# Patient Record
Sex: FEMALE | Race: WHITE | NOT HISPANIC OR LATINO | Employment: FULL TIME | URBAN - METROPOLITAN AREA
[De-identification: names, ages, dates, MRNs, and addresses within clinical notes are randomized per-mention and may not be internally consistent; named-entity substitution may affect disease eponyms.]

---

## 2017-02-10 DIAGNOSIS — Z12.31 ENCOUNTER FOR SCREENING MAMMOGRAM FOR MALIGNANT NEOPLASM OF BREAST: ICD-10-CM

## 2017-02-13 ENCOUNTER — ALLSCRIPTS OFFICE VISIT (OUTPATIENT)
Dept: OTHER | Facility: OTHER | Age: 62
End: 2017-02-13

## 2017-04-12 ENCOUNTER — ALLSCRIPTS OFFICE VISIT (OUTPATIENT)
Dept: OTHER | Facility: OTHER | Age: 62
End: 2017-04-12

## 2017-04-25 ENCOUNTER — ALLSCRIPTS OFFICE VISIT (OUTPATIENT)
Dept: OTHER | Facility: OTHER | Age: 62
End: 2017-04-25

## 2017-04-25 DIAGNOSIS — Z12.39 ENCOUNTER FOR OTHER SCREENING FOR MALIGNANT NEOPLASM OF BREAST: ICD-10-CM

## 2017-07-06 ENCOUNTER — TRANSCRIBE ORDERS (OUTPATIENT)
Dept: LAB | Facility: HOSPITAL | Age: 62
End: 2017-07-06

## 2017-07-06 DIAGNOSIS — Z00.8 HEALTH EXAMINATION IN POPULATION SURVEY: Primary | ICD-10-CM

## 2017-07-07 ENCOUNTER — APPOINTMENT (OUTPATIENT)
Dept: LAB | Facility: HOSPITAL | Age: 62
End: 2017-07-07
Payer: COMMERCIAL

## 2017-07-07 DIAGNOSIS — Z00.8 HEALTH EXAMINATION IN POPULATION SURVEY: ICD-10-CM

## 2017-07-07 LAB
CHOLEST SERPL-MCNC: 211 MG/DL (ref 50–200)
EST. AVERAGE GLUCOSE BLD GHB EST-MCNC: 120 MG/DL
HBA1C MFR BLD: 5.8 % (ref 4.2–6.3)
HDLC SERPL-MCNC: 71 MG/DL (ref 40–60)
LDLC SERPL CALC-MCNC: 124 MG/DL (ref 0–100)
TRIGL SERPL-MCNC: 79 MG/DL

## 2017-07-07 PROCEDURE — 80061 LIPID PANEL: CPT

## 2017-07-07 PROCEDURE — 83036 HEMOGLOBIN GLYCOSYLATED A1C: CPT

## 2017-07-07 PROCEDURE — 36415 COLL VENOUS BLD VENIPUNCTURE: CPT

## 2017-08-01 ENCOUNTER — ALLSCRIPTS OFFICE VISIT (OUTPATIENT)
Dept: OTHER | Facility: OTHER | Age: 62
End: 2017-08-01

## 2017-08-11 ENCOUNTER — ALLSCRIPTS OFFICE VISIT (OUTPATIENT)
Dept: OTHER | Facility: OTHER | Age: 62
End: 2017-08-11

## 2017-08-11 ENCOUNTER — TRANSCRIBE ORDERS (OUTPATIENT)
Dept: ADMINISTRATIVE | Facility: HOSPITAL | Age: 62
End: 2017-08-11

## 2017-08-11 ENCOUNTER — LAB REQUISITION (OUTPATIENT)
Dept: LAB | Facility: HOSPITAL | Age: 62
End: 2017-08-11
Payer: COMMERCIAL

## 2017-08-11 ENCOUNTER — HOSPITAL ENCOUNTER (OUTPATIENT)
Dept: RADIOLOGY | Age: 62
Discharge: HOME | End: 2017-08-11
Payer: COMMERCIAL

## 2017-08-11 DIAGNOSIS — R10.9 ABDOMINAL PAIN, UNSPECIFIED SITE: Primary | ICD-10-CM

## 2017-08-11 DIAGNOSIS — R10.9 ABDOMINAL PAIN: ICD-10-CM

## 2017-08-11 DIAGNOSIS — R10.9 ABDOMINAL PAIN, UNSPECIFIED SITE: ICD-10-CM

## 2017-08-11 LAB
BILIRUB UR QL STRIP: NEGATIVE
BILIRUB UR QL STRIP: NEGATIVE
CLARITY UR: CLEAR
CLARITY UR: NORMAL
COLOR UR: YELLOW
COLOR UR: YELLOW
GLUCOSE (HISTORICAL): NEGATIVE
GLUCOSE UR STRIP-MCNC: NEGATIVE MG/DL
HGB UR QL STRIP.AUTO: NEGATIVE
HGB UR QL STRIP.AUTO: NEGATIVE
KETONES UR STRIP-MCNC: NEGATIVE MG/DL
KETONES UR STRIP-MCNC: NEGATIVE MG/DL
LEUKOCYTE ESTERASE UR QL STRIP: NEGATIVE
LEUKOCYTE ESTERASE UR QL STRIP: NEGATIVE
NITRITE UR QL STRIP: NEGATIVE
NITRITE UR QL STRIP: NEGATIVE
PH UR STRIP.AUTO: 5 [PH]
PH UR STRIP.AUTO: 5 [PH] (ref 4.5–8)
PROT UR STRIP-MCNC: NEGATIVE MG/DL
PROT UR STRIP-MCNC: NEGATIVE MG/DL
SP GR UR STRIP.AUTO: 1.01
SP GR UR STRIP.AUTO: 1.01 (ref 1–1.03)
UROBILINOGEN UR QL STRIP.AUTO: 0.2
UROBILINOGEN UR QL STRIP.AUTO: 0.2 E.U./DL

## 2017-08-11 PROCEDURE — 74176 CT ABD & PELVIS W/O CONTRAST: CPT

## 2017-08-11 PROCEDURE — 81003 URINALYSIS AUTO W/O SCOPE: CPT | Performed by: FAMILY MEDICINE

## 2017-08-14 ENCOUNTER — GENERIC CONVERSION - ENCOUNTER (OUTPATIENT)
Dept: OTHER | Facility: OTHER | Age: 62
End: 2017-08-14

## 2017-09-11 ENCOUNTER — HOSPITAL ENCOUNTER (OUTPATIENT)
Dept: RADIOLOGY | Age: 62
Discharge: HOME | End: 2017-09-11
Payer: COMMERCIAL

## 2017-09-11 DIAGNOSIS — Z12.39 ENCOUNTER FOR OTHER SCREENING FOR MALIGNANT NEOPLASM OF BREAST: ICD-10-CM

## 2017-09-11 PROCEDURE — G0202 SCR MAMMO BI INCL CAD: HCPCS

## 2017-09-28 ENCOUNTER — ANESTHESIA EVENT (OUTPATIENT)
Dept: GASTROENTEROLOGY | Facility: HOSPITAL | Age: 62
End: 2017-09-28
Payer: COMMERCIAL

## 2017-09-28 RX ORDER — FAMOTIDINE 20 MG/1
20 TABLET, FILM COATED ORAL DAILY
COMMUNITY

## 2017-09-29 ENCOUNTER — HOSPITAL ENCOUNTER (OUTPATIENT)
Facility: HOSPITAL | Age: 62
Setting detail: OUTPATIENT SURGERY
Discharge: HOME | End: 2017-09-29
Attending: INTERNAL MEDICINE | Admitting: INTERNAL MEDICINE
Payer: COMMERCIAL

## 2017-09-29 ENCOUNTER — GENERIC CONVERSION - ENCOUNTER (OUTPATIENT)
Dept: OTHER | Facility: OTHER | Age: 62
End: 2017-09-29

## 2017-09-29 ENCOUNTER — ANESTHESIA (OUTPATIENT)
Dept: GASTROENTEROLOGY | Facility: HOSPITAL | Age: 62
End: 2017-09-29
Payer: COMMERCIAL

## 2017-09-29 VITALS
BODY MASS INDEX: 34.55 KG/M2 | OXYGEN SATURATION: 100 % | TEMPERATURE: 97.8 F | HEART RATE: 74 BPM | RESPIRATION RATE: 18 BRPM | HEIGHT: 63 IN | WEIGHT: 195 LBS | DIASTOLIC BLOOD PRESSURE: 73 MMHG | SYSTOLIC BLOOD PRESSURE: 135 MMHG

## 2017-09-29 DIAGNOSIS — Z12.11 ENCOUNTER FOR SCREENING FOR MALIGNANT NEOPLASM OF COLON: ICD-10-CM

## 2017-09-29 DIAGNOSIS — K21.9 GASTRO-ESOPHAGEAL REFLUX DISEASE WITHOUT ESOPHAGITIS: ICD-10-CM

## 2017-09-29 PROCEDURE — 88305 TISSUE EXAM BY PATHOLOGIST: CPT | Performed by: INTERNAL MEDICINE

## 2017-09-29 PROCEDURE — 88342 IMHCHEM/IMCYTCHM 1ST ANTB: CPT | Performed by: INTERNAL MEDICINE

## 2017-09-29 RX ORDER — SODIUM CHLORIDE 9 MG/ML
125 INJECTION, SOLUTION INTRAVENOUS CONTINUOUS
Status: DISCONTINUED | OUTPATIENT
Start: 2017-09-29 | End: 2017-09-29 | Stop reason: HOSPADM

## 2017-09-29 RX ORDER — PROPOFOL 10 MG/ML
INJECTION, EMULSION INTRAVENOUS AS NEEDED
Status: DISCONTINUED | OUTPATIENT
Start: 2017-09-29 | End: 2017-09-29 | Stop reason: SURG

## 2017-09-29 RX ADMIN — SODIUM CHLORIDE 125 ML/HR: 0.9 INJECTION, SOLUTION INTRAVENOUS at 10:17

## 2017-09-29 RX ADMIN — PROPOFOL 100 MG: 10 INJECTION, EMULSION INTRAVENOUS at 10:51

## 2017-09-29 RX ADMIN — PROPOFOL 100 MG: 10 INJECTION, EMULSION INTRAVENOUS at 11:11

## 2017-09-29 RX ADMIN — PROPOFOL 100 MG: 10 INJECTION, EMULSION INTRAVENOUS at 10:48

## 2017-09-29 NOTE — OP NOTE
OPERATIVE REPORT  PATIENT NAME: Franky Mora    :  1955  MRN: [id number]  Pt Location: [facility] ROOM 01    SURGERY DATE: 2017    Surgeon(s) and Role:     * Brando Baerd DO - Primary  Normachristy Cordoba, OMS-III also present for procedure  Preop Diagnosis:  Encounter for screening for malignant neoplasm of colon [Z12 11]  Gastro-esophageal reflux disease without esophagitis [K21 9]    Post-Op Diagnosis Codes:     * Encounter for screening for malignant neoplasm of colon [Z12 11]     * Gastro-esophageal reflux disease without esophagitis [K21 9]     * Gastritis [K29 70]    Procedure(s) (LRB):  EGD with bx  AND COLONOSCOPY (N/A)    Specimen(s):  ID Type Source Tests Collected by Time Destination   1 : body of stomach bx, gastritis Tissue Stomach TISSUE EXAM Brando Beard DO 2017 1057      ESOPHAGOGASTRODUODENOSCOPY    PROCEDURE: EGD    SEDATION: Monitored anesthesia care, check anesthesia records    ASA Class: 2    INDICATIONS: Chronic GERD    CONSENT:  Informed consent was obtained for the procedure, including sedation after explaining the risks and benefits of the procedure  Risks including but not limited to bleeding, perforation, infection, and missed lesion  PREPARATION:   Telemetry, pulse oximetry, blood pressure were monitored throughout the procedure  Patient was identified by myself both verbally and by visual inspection of ID band  DESCRIPTION:   Patient was placed in the left lateral decubitus position and was sedated with the above medication  The gastroscope was introduced in to the oropharynx and the esophagus was intubated under direct visualization  Scope was passed down the esophagus up to 2nd part of the duodenum  A careful inspection was made as the gastroscope was withdrawn, including a retroflexed view of the stomach; findings and interventions are described below       FINDINGS:    #1  Esophagus- Normal appearing esophagus with irregular Z line at [employer] junction  #2  Stomach- Mild gastritis present  Cold forceps biopsy performed on stomach body  #3  Duodenum- Normal first and second part of duodenum  IMPRESSIONS:    Normal appearing esophagus  Mild gastritis present; subsequent cold forceps biopsy obtained  Normal first and second part of duodenum  RECOMMENDATIONS:   Await biopsy results  Patient to follow-up as needed per symptoms  COMPLICATIONS:  None; patient tolerated the procedure well  DISPOSITION: PACU           CONDITION: Stable    Colonoscopy Procedure Note    Procedure: Colonoscopy    Sedation: Monitored anesthesia care, check anesthesia records      ASA Class: 2    INDICATIONS: Screening for Colon Cancer    POST-OP DIAGNOSIS: See the impression below    Procedure Details     Informed consent was obtained for the procedure, including sedation  Risks of perforation, hemorrhage, adverse drug reaction and aspiration were discussed  The patient was placed in the left lateral decubitus position  Based on the pre-procedure assessment, including review of the patient's medical history, medications, allergies, and review of systems, she had been deemed to be an appropriate candidate for conscious sedation; she was therefore sedated with the medications listed below  The patient was monitored continuously with telemetry, pulse oximetry, blood pressure monitoring, and direct observations  A rectal examination was performed  The colonoscope was inserted into the rectum and advanced under direct vision to the cecum, which was identified by the ileocecal valve and appendiceal orifice  The quality of the colonic preparation was good  A careful inspection was made as the colonoscope was withdrawn, including a retroflexed view of the rectum; findings and interventions are described below  Findings:  Normal appearing colon on direct view, except for the presence of a few scattered diverticuli in descending colon   Normal appearing rectum in direct and retroflexed view  Complications:  None; patient tolerated the procedure well  Impression:    Few diverticuli present in descending colon  Normal appearing rectum  Recommendations:  Recommend patient receive ten year follow-up screening colonoscopy        Tereza Garcia DO  DATE: September 29, 2017  TIME: 10:59 AM

## 2017-09-29 NOTE — DISCHARGE INSTRUCTIONS
Gastritis   WHAT YOU NEED TO KNOW:   Gastritis is inflammation or irritation of the lining of your stomach  DISCHARGE INSTRUCTIONS:   Call 911 for any of the following:   · You develop chest pain or shortness of breath  Seek care immediately if:   · You vomit blood  · You have black or bloody bowel movements  · You have severe stomach or back pain  Contact your healthcare provider if:   · You have a fever  · You have new or worsening symptoms, even after treatment  · You have questions or concerns about your condition or care  Medicines:   · Medicines  may be given to help treat a bacterial infection or decrease stomach acid  · Take your medicine as directed  Contact your healthcare provider if you think your medicine is not helping or if you have side effects  Tell him or her if you are allergic to any medicine  Keep a list of the medicines, vitamins, and herbs you take  Include the amounts, and when and why you take them  Bring the list or the pill bottles to follow-up visits  Carry your medicine list with you in case of an emergency  Manage or prevent gastritis:   · Do not smoke  Nicotine and other chemicals in cigarettes and cigars can make your symptoms worse and cause lung damage  Ask your healthcare provider for information if you currently smoke and need help to quit  E-cigarettes or smokeless tobacco still contain nicotine  Talk to your healthcare provider before you use these products  · Do not drink alcohol  Alcohol can prevent healing and make your gastritis worse  Talk to your healthcare provider if you need help to stop drinking  · Do not take NSAIDs or aspirin unless directed  These and similar medicines can cause irritation  If your healthcare provider says it is okay to take NSAIDs, take them with food  · Do not eat foods that cause irritation  Foods such as oranges and salsa can cause burning or pain  Eat a variety of healthy foods   Examples include fruits (not citrus), vegetables, low-fat dairy products, beans, whole-grain breads, and lean meats and fish  Try to eat small meals, and drink water with your meals  Do not eat for at least 3 hours before you go to bed  · Find ways to relax and decrease stress  Stress can increase stomach acid and make gastritis worse  Activities such as yoga, meditation, or listening to music can help you relax  Spend time with friends, or do things you enjoy  Follow up with your healthcare provider as directed: You may need ongoing tests or treatment, or referral to a gastroenterologist  Write down your questions so you remember to ask them during your visits  © 2017 [employer]  Information is for End User's use only and may not be sold, redistributed or otherwise used for commercial purposes  All illustrations and images included in CareNotes® are the copyrighted property of A D [employer] M [employer]  or [employer]  The above information is an  only  It is not intended as medical advice for individual conditions or treatments  Talk to your doctor, nurse or pharmacist before following any medical regimen to see if it is safe and effective for you  Diverticulosis   WHAT YOU NEED TO KNOW:   Diverticulosis is a condition that causes small pockets called diverticula to form in your intestine  These pockets make it difficult for bowel movements to pass through your digestive system  DISCHARGE INSTRUCTIONS:   Seek care immediately if:   · You have severe pain on the left side of your lower abdomen  · Your bowel movements are bright or dark red  Contact your healthcare provider if:   · You have a fever and chills  · You feel dizzy or lightheaded  · You have nausea, or you are vomiting  · You have a change in your bowel movements  · You have questions or concerns about your condition or care  Medicines:   · Medicines  to soften your bowel movements may be given  You may also need medicines to treat symptoms such as bloating and pain  · Take your medicine as directed  Contact your healthcare provider if you think your medicine is not helping or if you have side effects  Tell him or her if you are allergic to any medicine  Keep a list of the medicines, vitamins, and herbs you take  Include the amounts, and when and why you take them  Bring the list or the pill bottles to follow-up visits  Carry your medicine list with you in case of an emergency  Self-care: The goal of treatment is to manage any symptoms you have and prevent other problems such as diverticulitis  Diverticulitis is swelling or infection of the diverticula  Your healthcare provider may recommend any of the following:  · Eat a variety of high-fiber foods  High-fiber foods help you have regular bowel movements  High-fiber foods include cooked beans, fruits, vegetables, and some cereals  Most adults need 25 to 35 grams of fiber each day  Your healthcare provider may recommend that you have more  Ask your healthcare provider how much fiber you need  Increase fiber slowly  You may have abdominal discomfort, bloating, and gas if you add fiber to your diet too quickly  You may need to take a fiber supplement if you are not getting enough fiber from food  · Drink liquids as directed  You may need to drink 2 to 3 liters (8 to 12 cups) of liquids every day  Ask your healthcare provider how much liquid to drink each day and which liquids are best for you  · Apply heat  on your abdomen for 20 to 30 minutes every 2 hours for as many days as directed  Heat helps decrease pain and muscle spasms  Help prevent diverticulitis or other symptoms: The following may help decrease your risk for diverticulitis or symptoms, such as bleeding  Talk to your provider about these or other things you can do to prevent problems that may occur with diverticulosis  · Exercise regularly    Ask your healthcare provider about the best exercise plan for you  Exercise can help you have regular bowel movements  Get 30 minutes of exercise on most days of the week  · Maintain a healthy weight  Ask your healthcare provider how much you should weigh  Ask him or her to help you create a weight loss plan if you are overweight  · Do not smoke  Nicotine and other chemicals in cigarettes increase your risk for diverticulitis  Ask your healthcare provider for information if you currently smoke and need help to quit  E-cigarettes or smokeless tobacco still contain nicotine  Talk to your healthcare provider before you use these products  · Ask your healthcare provider if it is safe to take NSAIDs  NSAIDs may increase your risk of diverticulitis  Follow up with your healthcare provider as directed:  Write down your questions so you remember to ask them during your visits  © 2017 [employer] [facility] Information is for End User's use only and may not be sold, redistributed or otherwise used for commercial purposes  All illustrations and images included in CareNotes® are the copyrighted property of A D A M , Inc  or [employer]  The above information is an  only  It is not intended as medical advice for individual conditions or treatments  Talk to your doctor, nurse or pharmacist before following any medical regimen to see if it is safe and effective for you

## 2017-10-06 ENCOUNTER — GENERIC CONVERSION - ENCOUNTER (OUTPATIENT)
Dept: OTHER | Facility: OTHER | Age: 62
End: 2017-10-06

## 2018-01-09 NOTE — MISCELLANEOUS
Provider Comments  Provider Comments:   PT NO SHOW FOR APPT      Signatures   Electronically signed by :  Mariana Taylor, ; Feb 13 2017 10:51AM EST                       (Author)

## 2018-01-10 NOTE — RESULT NOTES
Discussion/Summary   called pt and gave results of h pylori  will treat     Verified Results  (1) TISSUE EXAM [id number] 10:57AM 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  - Chronic oxyntic gastritis  Note: H  pylori organisms identified on H&E and immunohistochemical stains   in the submitted antral biopsy  The submitted oxyntic biopsies show no   evidence of Helicobacter pylori organisms on H&E or immunohistochemical   stain  Appropriate positive and negative controls obtained  Interpretation performed at [place] [address]  Electronically signed by Petrona Mackey DO on 10/3/2017 at 10:32 AM   LAB AP SURGICAL ADDITIONAL INFORMATION (Report)     All controls performed with the immunohistochemical stains reported above   reacted appropriately  These tests were developed and their performance   characteristics determined by [facility] or   [address] [place] [address]  They may not be cleared or approved by the [place]  Food and Drug Administration  The FDA has determined that such clearance   or approval is not necessary  These tests are used for clinical purposes  They should not be regarded as investigational or for research  This   laboratory has been approved by [place] [address], designated as a high-complexity   laboratory and is qualified to perform these tests     LAB AP GROSS DESCRIPTION (Report)     A  The specimen is received in formalin, labeled with the patient's name   and hospital number, and is designated body of stomach biopsy  The   specimen consists of 5 tan-pink soft tissue fragments ranging from 0 2 cm   to 0 5 cm in greatest dimension  Entirely submitted  One cassette  Note: The estimated total formalin fixation time based upon information   provided by the submitting clinician and the standard processing schedule   is approximately 72 hours     MAS

## 2018-01-12 VITALS
DIASTOLIC BLOOD PRESSURE: 70 MMHG | HEART RATE: 76 BPM | HEIGHT: 64 IN | TEMPERATURE: 98.4 F | WEIGHT: 200 LBS | SYSTOLIC BLOOD PRESSURE: 118 MMHG | BODY MASS INDEX: 34.15 KG/M2 | RESPIRATION RATE: 18 BRPM

## 2018-01-13 VITALS
SYSTOLIC BLOOD PRESSURE: 126 MMHG | DIASTOLIC BLOOD PRESSURE: 80 MMHG | WEIGHT: 204.5 LBS | TEMPERATURE: 98 F | HEART RATE: 64 BPM | RESPIRATION RATE: 16 BRPM | BODY MASS INDEX: 34.91 KG/M2 | HEIGHT: 64 IN

## 2018-01-13 VITALS
HEART RATE: 84 BPM | TEMPERATURE: 98.4 F | WEIGHT: 202.25 LBS | DIASTOLIC BLOOD PRESSURE: 90 MMHG | RESPIRATION RATE: 14 BRPM | HEIGHT: 64 IN | SYSTOLIC BLOOD PRESSURE: 142 MMHG | BODY MASS INDEX: 34.53 KG/M2

## 2018-01-13 NOTE — RESULT NOTES
Verified Results  XR HIP/PELV 4+ VIEW RIGHT [id number] 03:26PM Elena Biancih Order Number: [id number]     Test Name Result Flag Reference   XR HIP/PELV 4+ VW RIGHT (Report)     RIGHT HIP     INDICATION: Right hip pain  COMPARISON: Left hip May 1, 2014     VIEWS: AP (x2) pelvis and 2 coned down views of the hip; 4 images     FINDINGS:     There is no acute fracture or dislocation  No degenerative changes  No lytic or blastic lesions are seen  Soft tissues are unremarkable  IMPRESSION:     No acute osseous abnormality  Workstation performed: [id number]     Signed by:   Jacklyn Mejia MD   5/3/16

## 2018-01-14 VITALS
DIASTOLIC BLOOD PRESSURE: 88 MMHG | HEIGHT: 64 IN | SYSTOLIC BLOOD PRESSURE: 151 MMHG | WEIGHT: 200.38 LBS | OXYGEN SATURATION: 98 % | BODY MASS INDEX: 34.21 KG/M2 | HEART RATE: 94 BPM

## 2018-02-09 ENCOUNTER — HOSPITAL ENCOUNTER (EMERGENCY)
Facility: HOSPITAL | Age: 63
Discharge: HOME | End: 2018-02-09
Attending: EMERGENCY MEDICINE | Admitting: EMERGENCY MEDICINE
Payer: COMMERCIAL

## 2018-02-09 ENCOUNTER — APPOINTMENT (EMERGENCY)
Dept: RADIOLOGY | Facility: HOSPITAL | Age: 63
End: 2018-02-09
Payer: COMMERCIAL

## 2018-02-09 VITALS
HEART RATE: 82 BPM | DIASTOLIC BLOOD PRESSURE: 88 MMHG | TEMPERATURE: 97.7 F | HEIGHT: 64 IN | WEIGHT: 200 LBS | SYSTOLIC BLOOD PRESSURE: 151 MMHG | OXYGEN SATURATION: 95 % | RESPIRATION RATE: 23 BRPM | BODY MASS INDEX: 34.15 KG/M2

## 2018-02-09 DIAGNOSIS — S52.532A COLLES' FRACTURE OF LEFT RADIUS: Primary | ICD-10-CM

## 2018-02-09 PROCEDURE — 99284 EMERGENCY DEPT VISIT MOD MDM: CPT

## 2018-02-09 PROCEDURE — 73100 X-RAY EXAM OF WRIST: CPT

## 2018-02-09 PROCEDURE — 73070 X-RAY EXAM OF ELBOW: CPT

## 2018-02-09 PROCEDURE — 96374 THER/PROPH/DIAG INJ IV PUSH: CPT

## 2018-02-09 PROCEDURE — 73110 X-RAY EXAM OF WRIST: CPT

## 2018-02-09 PROCEDURE — 96376 TX/PRO/DX INJ SAME DRUG ADON: CPT

## 2018-02-09 PROCEDURE — 96375 TX/PRO/DX INJ NEW DRUG ADDON: CPT

## 2018-02-09 RX ORDER — ACETAMINOPHEN 325 MG/1
650 TABLET ORAL ONCE
Status: COMPLETED | OUTPATIENT
Start: 2018-02-09 | End: 2018-02-09

## 2018-02-09 RX ORDER — FENTANYL CITRATE 50 UG/ML
50 INJECTION, SOLUTION INTRAMUSCULAR; INTRAVENOUS ONCE
Status: COMPLETED | OUTPATIENT
Start: 2018-02-09 | End: 2018-02-09

## 2018-02-09 RX ORDER — PROPOFOL 10 MG/ML
110 INJECTION, EMULSION INTRAVENOUS ONCE
Status: COMPLETED | OUTPATIENT
Start: 2018-02-09 | End: 2018-02-09

## 2018-02-09 RX ORDER — OXYCODONE HYDROCHLORIDE AND ACETAMINOPHEN 5; 325 MG/1; MG/1
1 TABLET ORAL EVERY 6 HOURS PRN
Qty: 12 TABLET | Refills: 0 | Status: SHIPPED | OUTPATIENT
Start: 2018-02-09 | End: 2018-02-12

## 2018-02-09 RX ORDER — PROPOFOL 10 MG/ML
1 INJECTION, EMULSION INTRAVENOUS ONCE
Status: COMPLETED | OUTPATIENT
Start: 2018-02-09 | End: 2018-02-09

## 2018-02-09 RX ORDER — PROPOFOL 10 MG/ML
INJECTION, EMULSION INTRAVENOUS
Status: DISCONTINUED
Start: 2018-02-09 | End: 2018-02-10 | Stop reason: HOSPADM

## 2018-02-09 RX ORDER — LIDOCAINE HYDROCHLORIDE AND EPINEPHRINE 10; 10 MG/ML; UG/ML
20 INJECTION, SOLUTION INFILTRATION; PERINEURAL ONCE
Status: COMPLETED | OUTPATIENT
Start: 2018-02-09 | End: 2018-02-09

## 2018-02-09 RX ORDER — OMEPRAZOLE 40 MG/1
1 CAPSULE, DELAYED RELEASE ORAL 2 TIMES DAILY
COMMUNITY
Start: 2017-10-06 | End: 2018-11-27 | Stop reason: ALTCHOICE

## 2018-02-09 RX ORDER — ONDANSETRON 2 MG/ML
4 INJECTION INTRAMUSCULAR; INTRAVENOUS ONCE
Status: COMPLETED | OUTPATIENT
Start: 2018-02-09 | End: 2018-02-09

## 2018-02-09 RX ADMIN — PROPOFOL 110 MG: 10 INJECTION, EMULSION INTRAVENOUS at 23:00

## 2018-02-09 RX ADMIN — LIDOCAINE HYDROCHLORIDE 100 MG: 20 INJECTION INTRAVENOUS at 19:11

## 2018-02-09 RX ADMIN — FENTANYL CITRATE 50 MCG: 50 INJECTION INTRAMUSCULAR; INTRAVENOUS at 17:55

## 2018-02-09 RX ADMIN — PROPOFOL 50 MG: 10 INJECTION, EMULSION INTRAVENOUS at 21:53

## 2018-02-09 RX ADMIN — LIDOCAINE HYDROCHLORIDE 200 MG: 20 INJECTION INTRAVENOUS at 19:10

## 2018-02-09 RX ADMIN — LIDOCAINE HYDROCHLORIDE,EPINEPHRINE BITARTRATE 20 ML: 10; .01 INJECTION, SOLUTION INFILTRATION; PERINEURAL at 18:04

## 2018-02-09 RX ADMIN — ONDANSETRON 4 MG: 2 INJECTION INTRAMUSCULAR; INTRAVENOUS at 17:55

## 2018-02-09 RX ADMIN — PROPOFOL 91 MG: 10 INJECTION, EMULSION INTRAVENOUS at 21:50

## 2018-02-09 RX ADMIN — ACETAMINOPHEN 650 MG: 325 TABLET, FILM COATED ORAL at 17:46

## 2018-02-09 RX ADMIN — FENTANYL CITRATE 50 MCG: 50 INJECTION INTRAMUSCULAR; INTRAVENOUS at 19:04

## 2018-02-09 RX ADMIN — FENTANYL CITRATE 50 MCG: 50 INJECTION INTRAMUSCULAR; INTRAVENOUS at 21:47

## 2018-02-09 NOTE — ED ATTENDING ATTESTATION
Jerald Vasques MD, saw and evaluated the patient  All available labs and X-rays were ordered by me or the resident and have been reviewed by myself  I discussed the patient with the resident / non-physician and agree with the resident's / non-physician practitioner's findings and plan as documented in the resident's / non-physician practicitioner's note, except where noted  At this point, I agree with the current assessment done in the ED  Chief Complaint   Patient presents with    Wrist Injury     slipped on ice while feeding the birds, sustained injury to left wrist with obvious deformity,  + pulse palpable     The patient was bird feeding and slipped on ice, broke her fall with left wrist   Obvious deformity  Occurred PTA  Denies f/ch/n/v/cp/sob  No head trauma  Remembers events  Denies numbness / tingling  PMH:  - GERD  - DVt  - Hx of PE  PSH:  - colonoscopy  Denies smoking, drinking, drugs  PE:  Vitals:    02/09/18 2230 02/09/18 2235 02/09/18 2240 02/09/18 2245   BP: 167/96 168/96 168/96 151/88   BP Location: Right arm Right arm Right arm Right arm   Pulse: 84 82 82 82   Resp: 18 18 18 (!) 23   Temp:       TempSrc:       SpO2: 95% 95% 96% 95%   Weight:       Height:       General: VS reviewed  Appears in NAD  awake, alert  Well-nourished, well-developed  Appears stated age  Speaking normally in full sentences  Head: Normocephalic, atraumatic, nontender  Eyes: EOM-I  No diplopia  No hyphema  No subconjunctival hemorrhages  Symmetrical lids  ENT: Atraumatic external nose and ears  MMM  No malocclusion  No stridor  Normal phonation  No drooling  Normal swallowing  Neck: No JVD  CV: No pallor noted  Peripheral pulses +2 throughout  No chest wall tenderness     Lungs:   No tachypnea  No respiratory distress  MSK:   1+2 possible, but severely painful  Refuses 1+5 but can weakly attempt  2+ radial pulse pable  <2 second cap refill   normal  Refuses wrist movements  Obvious deformity  BICEPS TRIPCES 5/5  ABD/ADD of shoudler 5/5  M/U/R/A sensation intact, b/l equal  No skin deformity  Skin: Dry, intact  Neuro: Awake, alert, GCS15, CN II-XII grossly intact  Motor grossly intact  Psychiatric/Behavioral: Appropriate mood and affect   Exam: deferred  A:  - Wrist fx  P:  - block  - XR confirm  - reduce  - splint  - 13 point ROS was performed and all are normal unless stated in the history above  - Nursing note reviewed  Vitals reviewed  - Orders placed by myself and/or advanced practitioner / resident     - Previous chart was not reviewed  - No language barrier    - History obtained from patient  - There are no limitations to the history obtained  - Critical care time: Not applicable for this patient  Final Diagnosis:  1  Colles' fracture of left radius        ED Course as of Feb 12 1350 Fri Feb 09, 2018 1925 Has significant improvement in s patient had significant improvementymptom in symptoms  She is placed in finger traps  2216 I was present for propofol sedation    Successful  Transient hypoxia to high 80s      Medications   acetaminophen (TYLENOL) tablet 650 mg (650 mg Oral Given 2/9/18 1746)   fentanyl citrate (PF) 100 MCG/2ML 50 mcg (50 mcg Intravenous Given 2/9/18 1755)   ondansetron (ZOFRAN) injection 4 mg (4 mg Intravenous Given 2/9/18 1755)   lidocaine-epinephrine (XYLOCAINE/EPINEPHRINE) 1 %-1:100,000 injection 20 mL (20 mL Infiltration Given 2/9/18 1804)   lidocaine (cardiac) 20 mg/mL injection **AcuDose Override Pull** (200 mg  Given 2/9/18 1910)   lidocaine (cardiac) 20 mg/mL injection **AcuDose Override Pull** (100 mg  Given 2/9/18 1911)   fentanyl citrate (PF) 100 MCG/2ML 50 mcg (50 mcg Intravenous Given 2/9/18 1904)   propofol (DIPRIVAN) 200 MG/20ML bolus injection 91 mg (50 mg Intravenous Given 2/9/18 2153)   propofol (DIPRIVAN) 200 MG/20ML bolus injection 91 mg (91 mg Intravenous Given 2/9/18 2150)   fentanyl citrate (PF) 100 MCG/2ML 50 mcg (50 mcg Intravenous Given 2/9/18 7188)   propofol (DIPRIVAN) 200 MG/20ML bolus injection 110 mg (110 mg Intravenous Given by Other 2/9/18 2300)     XR wrist 2 vw left   Final Result   Substantially improved alignment of acute impacted Colles' fracture  Stable appearance of ulnar styloid fracture  Workstation performed: [id number]         XR wrist 2 vw left   Final Result   Improved alignment of acute comminuted impacted Colles' fracture  Stable minimally displaced ulnar styloid fracture  Workstation performed: [id number]         XR wrist 2 vw left   Final Result   Acute impacted dorsally displaced and angulated Colles' fracture  Minimally displaced ulnar styloid fracture          Findings are consistent with emergency room physician's preliminary reading                  Workstation performed: [id number]         XR elbow 2 vw left   Final Result      No acute osseous abnormality  Workstation performed: [id number]         XR wrist 3+ views LEFT   ED Interpretation   Abnormal   The WRIST was ordered by resident and interpreted by me independently  On my read, it appears:   - colles   - rad fx   - ulnar fx      Final Result      Distal radial fracture with dorsal subluxation of the distal component  (Colles fracture)   Ulnar styloid fracture which appears old           Workstation performed: [id number]           Orders Placed This Encounter   Procedures    Nerve block    ED procedural sedation    XR wrist 3+ views LEFT    XR elbow 2 vw left    XR wrist 2 vw left    XR wrist 2 vw left    XR wrist 2 vw left     Labs Reviewed - No data to display  Time reflects when diagnosis was documented in both MDM as applicable and the Disposition within this note     Time User Action Codes Description Comment    2/9/2018  7:43 PM Graciela Morrison Add [N13 806A] Colles' fracture of left radius       ED Disposition     ED Disposition Condition Comment    Discharge  Mary Prasad discharge to home/self care     Condition at discharge: Stable        Follow-up Information     Follow up With Specialties Details Why Contact Info Additional Information    Adalid Fernandes MD Orthopedic Surgery Call in 3 day(s)  [address] [facility] [phone]       [facility] [place] Emergency Department Emergency Medicine Follow up If symptoms worsen [address] [place]  [phone]  ED, [address] [place] [address], [id number]        Discharge Medication List as of 2/9/2018  9:17 PM      START taking these medications    Details   oxyCODONE-acetaminophen (PERCOCET) 5-325 mg per tablet Take 1 tablet by mouth every 6 (six) hours as needed for moderate pain for up to 3 days Max Daily Amount: 4 tablets, Starting Fri 2/9/2018, Until Mon 2/12/2018, Print         CONTINUE these medications which have NOT CHANGED    Details   omeprazole (PriLOSEC) 40 MG capsule Take 1 capsule by mouth 2 (two) times a day, Starting Fri 10/6/2017, Historical Med      rivaroxaban (XARELTO) 20 mg tablet Take 20 mg by mouth daily with breakfast, Historical Med      famotidine (PEPCID) 20 mg tablet Take 20 mg by mouth daily  , Historical Med      FLUTICASONE PROPIONATE NA into each nostril, Historical Med           No discharge procedures on file  Prior to Admission Medications   Prescriptions Last Dose Informant Patient Reported? Taking? FLUTICASONE PROPIONATE NA   Yes No   Sig: into each nostril   famotidine (PEPCID) 20 mg tablet   Yes No   Sig: Take 20 mg by mouth daily     omeprazole (PriLOSEC) 40 MG capsule   Yes Yes   Sig: Take 1 capsule by mouth 2 (two) times a day   rivaroxaban (XARELTO) 20 mg tablet   Yes Yes   Sig: Take 20 mg by mouth daily with breakfast      Facility-Administered Medications: None       Portions of the record may have been created with voice recognition software   Occasional wrong word or "sound a like" substitutions may have occurred due to the inherent limitations of voice recognition software  Read the chart carefully and recognize, using context, where substitutions have occurred      Electronically signed by:  Elder Falk

## 2018-02-09 NOTE — ED PROVIDER NOTES
History  Chief Complaint   Patient presents with    Wrist Injury     slipped on ice while feeding the birds, sustained injury to left wrist with obvious deformity,  + pulse palpable     HPI  57 yo female presenting with wrist pain after fall on ice  Patient says she was feeding birds outside, holding bucket of urgency in her right hand, she slipped and fell towards the left side  She fell onto her outstretched left hand  Patient had impacted her left hand and felt a crunch  Patient then fell and onto the ground onto her bottom, did not hit her head, denies loss of consciousness  Patient has been unable to move her left hand since the fall, this occurred about an hour prior to arrival   Patient does complain of numbness in her finger tips  Pain is mostly at the wrist, patient denies any pain in the elbow with shoulder  She denies headache, neck pain, back pain, chest pain  Patient does have some nausea, no vomiting since the incident  She has never broken her left upper extremity 4  She is left handed  Patient with history of DVT/PE on Xarelto  Prior to Admission Medications   Prescriptions Last Dose Informant Patient Reported? Taking?    FLUTICASONE PROPIONATE NA   Yes No   Sig: into each nostril   famotidine (PEPCID) 20 mg tablet   Yes No   Sig: Take 20 mg by mouth daily     omeprazole (PriLOSEC) 40 MG capsule   Yes Yes   Sig: Take 1 capsule by mouth 2 (two) times a day   rivaroxaban (XARELTO) 20 mg tablet   Yes Yes   Sig: Take 20 mg by mouth daily with breakfast      Facility-Administered Medications: None       Past Medical History:   Diagnosis Date    Allergic rhinitis     DVT of lower extremity (deep venous thrombosis) ([facility] ) 2014    GERD (gastroesophageal reflux disease)     History of acute bronchitis     Pulmonary embolism ([facility] ) 2014       Past Surgical History:   Procedure Laterality Date    [place] COLONOSCOPY FLX DX W/COLLJ SPEC WHEN PFRMD N/A 9/29/2017    Procedure: EGD with bx  AND COLONOSCOPY;  Surgeon: Henri Funk DO;  Location: [facility]; Service: Gastroenterology       History reviewed  No pertinent family history  I have reviewed and agree with the history as documented  Social History   Substance Use Topics    Smoking status: Never Smoker    Smokeless tobacco: Never Used    Alcohol use Yes      Comment: socially        Review of Systems    Constitutional: Negative for appetite change, chills and fever  HENT: Negative for congestion, rhinorrhea and sore throat  Eyes: Negative for photophobia, pain and visual disturbance  Respiratory: Negative for cough, chest tightness and shortness of breath  Cardiovascular: Negative for chest pain, palpitations and leg swelling  Gastrointestinal: Negative for abdominal pain, diarrhea and vomiting  Positive for nausea   Genitourinary: Negative for dysuria, flank pain and hematuria  Musculoskeletal: Negative for back pain, neck pain and neck stiffness  Skin: Negative for color change, rash and wound  Neurological: Negative for dizziness, numbness and headaches  All other systems reviewed and are negative      Physical Exam  ED Triage Vitals   Temperature Pulse Respirations Blood Pressure SpO2   02/09/18 1911 02/09/18 1741 02/09/18 1741 02/09/18 1741 02/09/18 1741   97 7 °F (36 5 °C) (!) 111 18 (!) 178/81 100 %      Temp Source Heart Rate Source Patient Position - Orthostatic VS BP Location FiO2 (%)   02/09/18 1911 02/09/18 1911 02/09/18 1911 02/09/18 1741 --   Oral Monitor Lying Right arm       Pain Score       02/09/18 1741       4           Orthostatic Vital Signs  Vitals:    02/09/18 2230 02/09/18 2235 02/09/18 2240 02/09/18 2245   BP: 167/96 168/96 168/96 151/88   Pulse: 84 82 82 82   Patient Position - Orthostatic VS: Lying Lying Lying Lying       Physical Exam  /88 (BP Location: Right arm)   Pulse 82   Temp 97 7 °F (36 5 °C) (Oral)   Resp (!) 23   Ht 5' 4" (1 626 m)   Wt 90 7 kg (200 lb)   SpO2 95%   BMI 34 33 kg/m²     General Appearance:  Alert, cooperative, no distress   Head:  Normocephalic, without obvious abnormality, atraumatic   Eyes:  PERRL, conjunctiva/corneas clear, EOM's intact       Nose: Nares normal, septum midline,mucosa normal, no drainage or sinus tenderness   Throat: Lips, mucosa, and tongue normal; teeth and gums normal   Neck: Supple, symmetrical, trachea midline, no adenopathy   Back:   Symmetric, no curvature, ROM normal, no CVA tenderness   Lungs:   Clear to auscultation bilaterally, respirations unlabored   Heart:  Regular rate and rhythm, S1 and S2 normal, no murmur, rub, or gallop   Abdomen:   Soft, non-tender, bowel sounds active all four quadrants   Pelvic: Deferred   Extremities: Left wrist with obvious deformity, distal wrist appears dorsally displaced    There is swelling   Pulses: 2+ and symmetric   Skin: Skin color, texture, turgor normal, no rashes or lesions   Neurologic:      Psychiatric: Moves all extremities, sensation and strength in tact in all extremities    Normal mood and affect         ED Medications  Medications   acetaminophen (TYLENOL) tablet 650 mg (650 mg Oral Given 2/9/18 1746)   fentanyl citrate (PF) 100 MCG/2ML 50 mcg (50 mcg Intravenous Given 2/9/18 1755)   ondansetron (ZOFRAN) injection 4 mg (4 mg Intravenous Given 2/9/18 1755)   lidocaine-epinephrine (XYLOCAINE/EPINEPHRINE) 1 %-1:100,000 injection 20 mL (20 mL Infiltration Given 2/9/18 1804)   lidocaine (cardiac) 20 mg/mL injection **AcuDose Override Pull** (200 mg  Given 2/9/18 1910)   lidocaine (cardiac) 20 mg/mL injection **AcuDose Override Pull** (100 mg  Given 2/9/18 1911)   fentanyl citrate (PF) 100 MCG/2ML 50 mcg (50 mcg Intravenous Given 2/9/18 1904)   propofol (DIPRIVAN) 200 MG/20ML bolus injection 91 mg (50 mg Intravenous Given 2/9/18 2153)   propofol (DIPRIVAN) 200 MG/20ML bolus injection 91 mg (91 mg Intravenous Given 2/9/18 2150)   fentanyl citrate (PF) 100 MCG/2ML 50 mcg (50 mcg Intravenous Given 2/9/18 2147)   propofol (DIPRIVAN) 200 MG/20ML bolus injection 110 mg (110 mg Intravenous Given by Other 2/9/18 2300)       Diagnostic Studies  Results Reviewed     None                 XR wrist 3+ views LEFT   ED Interpretation by Yessi Oliveira MD (02/09 8715)   Abnormal   The WRIST was ordered by resident and interpreted by me independently  On my read, it appears:   - colles   - rad fx   - ulnar fx      Final Result by Ricky Pascal MD (02/09 2153)      Distal radial fracture with dorsal subluxation of the distal component  (Colles fracture)   Ulnar styloid fracture which appears old  Workstation performed: [id number]         XR elbow 2 vw left    (Results Pending)   XR wrist 2 vw left    (Results Pending)   XR wrist 2 vw left    (Results Pending)   XR wrist 2 vw left    (Results Pending)         Procedures  Procedural Sedation  Date/Time: 2/9/2018 10:27 PM  Performed by: Maliha Gonzalez by: Fernando Orellana     Consent:     Consent obtained:  Written and verbal    Consent given by:  Patient    Risks discussed:   Allergic reaction, inadequate sedation, dysrhythmia, nausea, prolonged sedation necessitating reversal, prolonged hypoxia resulting in organ damage, respiratory compromise necessitating ventilatory assistance and intubation and vomiting    Alternatives discussed:  Analgesia without sedation and regional anesthesia  [place] protocol:     Procedure explained and questions answered to patient or proxy's satisfaction: yes      Relevant documents present and verified: yes      Patient identity confirmation method:  Verbally with patient, arm band and provided demographic data  Indications:     Sedation purpose:  Fracture reduction    Procedure necessitating sedation performed by:  Physician performing sedation  Pre-sedation assessment:     Time since last food or drink:  Unknown    ASA classification: class 2 - patient with mild systemic disease      Neck mobility: normal      Mouth opening:  3 or more finger widths    Thyromental distance:  4 finger widths    Mallampati score:  II - soft palate, uvula, fauces visible    Pre-sedation assessments completed and reviewed: airway patency, cardiovascular function, hydration status, mental status, nausea/vomiting, pain level, respiratory function and temperature      History of difficult intubation: no      Pre-sedation assessment completed:  2/9/2018 9:30 PM  Immediate pre-procedure details:     Reassessment: Patient reassessed immediately prior to procedure      Reviewed: vital signs      Verified: bag valve mask available, emergency equipment available, intubation equipment available, IV patency confirmed, oxygen available and suction available    Procedure details (see MAR for exact dosages):     Sedation start time:  2/9/2018 9:45 PM    Preoxygenation:  Nasal cannula    Sedation:  Propofol    Analgesia:  Fentanyl    Intra-procedure monitoring:  Blood pressure monitoring, cardiac monitor, continuous capnometry, continuous pulse oximetry, frequent vital sign checks and frequent LOC assessments    Intra-procedure events: hypoxia      Intra-procedure events comment:  Down to 84%    Intra-procedure management:  Supplemental oxygen (placed on non rebreather)    Sedation end time:  2/9/2018 10:25 PM    Total sedation time (minutes):  40  Post-procedure details:     Post-sedation assessment completed:  2/9/2018 10:30 PM    Attendance: Constant attendance by certified staff until patient recovered      Recovery: Patient returned to pre-procedure baseline      Post-sedation assessments completed and reviewed: airway patency, cardiovascular function, hydration status, mental status, nausea/vomiting, pain level and respiratory function      Patient is stable for discharge or admission: yes      Patient tolerance: Tolerated well, no immediate complications  Comments:       Ox went up to 100% on nonrebreather          Phone [address]  ED Phone Contact    ED Course  ED Course as of Feb 09 2305 Fri Feb 09, 2018 1913 Supraclavicular brachial plexus nerve block performed, patient does have some relief  Will place in finger splint once adequately anesthetized    2231 Procedural sedation used to reduce fracture, successful, will DC home with pain control  2242 Patient is clear for discharge from ortho         MDM   Patient with fracture of left wrist, will get films of elbow given tenderness at her proximal radius/ulna  CritCare Time    Disposition  Final diagnoses:   Colles' fracture of left radius     Time reflects when diagnosis was documented in both MDM as applicable and the Disposition within this note     Time User Action Codes Description Comment    2/9/2018  7:43 PM Diana Israel Add [[id number]] Colles' fracture of left radius       ED Disposition     ED Disposition Condition Comment    Discharge  Esperanza Quan discharge to home/self care      Condition at discharge: Stable        Follow-up Information     Follow up With Specialties Details Why Contact Info Additional Information    Jessica Renner MD Orthopedic Surgery Call in 3 day(s)  [place] [phone]       [address] [place] [address] Emergency Department Emergency Medicine Follow up If symptoms worsen [address] [place] [address]  [phone]  ED, [address], [place], [address]        Discharge Medication List as of 2/9/2018  9:17 PM      START taking these medications    Details   oxyCODONE-acetaminophen (PERCOCET) 5-325 mg per tablet Take 1 tablet by mouth every 6 (six) hours as needed for moderate pain for up to 3 days Max Daily Amount: 4 tablets, Starting Fri 2/9/2018, Until Mon 2/12/2018, Print         CONTINUE these medications which have NOT CHANGED    Details   omeprazole (PriLOSEC) 40 MG capsule Take 1 capsule by mouth 2 (two) times a day, Starting Fri 10/6/2017, Historical Med      rivaroxaban (XARELTO) 20 mg tablet Take 20 mg by mouth daily with breakfast, Historical Med      famotidine (PEPCID) 20 mg tablet Take 20 mg by mouth daily  , Historical Med      FLUTICASONE PROPIONATE NA into each nostril, Historical Med           No discharge procedures on file  ED Provider  Attending physically available and evaluated Sasha Grider I managed the patient along with the ED Attending      Electronically Signed by         Jeaneth Quiñonez MD  02/09/18 8712

## 2018-02-10 NOTE — DISCHARGE INSTRUCTIONS
Discharge Instructions - Orthopedics  Tan Shell 58 y o  female MRN: [id number]  Unit/Bed#: X ray    Weight Bearing Status:                                           Non weight bearing left upper extremity in sugartong splint and sling    Pain:  Continue analgesics as directed    Dressing Instructions:   Keep dressing clean, dry and intact until follow up appointment  PT/OT:  Continue PT/OT on outpatient basis as directed    Appt Instructions: If you do not have your appointment, please call the clinic at [phone] to f/u with Dr Luis Angel Diallo as scheduled    Contact the office sooner if you experience any increased numbness/tingling in the extremities  Miscellaneous:    Wrist Fracture in Adults   WHAT YOU NEED TO KNOW:   What is a wrist fracture? A wrist fracture is a break in one or more of the bones in your wrist  A wrist fracture may be caused by a fall, car accident, or sports injury  In older adults, a wrist fracture may be caused by weak bones  What are the signs and symptoms of a wrist fracture? · Pain, swelling, and bruising of your injured wrist    · Wrist pain that is worse when you hold something or put pressure on your wrist    · Weakness, numbness, or tingling in your injured hand or wrist    · Trouble moving your wrist, hand, or fingers    · A change in the shape of your wrist  How is a wrist fracture diagnosed? Your healthcare provider will examine you  You may need an x-ray, CT scan, or MRI  You may be given contrast liquid to help your wrist bones show up better in pictures  Tell the healthcare provider if you have ever had an allergic reaction to contrast liquid  Do not enter the MRI room with anything metal  Metal can cause serious injury  Tell the healthcare provider if you have any metal in or on your body  How is a wrist fracture treated? Treatment will depend on which wrist bone was broken and the kind of fracture you have   You may need any of the following:  · Medicine may be given to decrease pain and swelling  You may need antibiotic medicine or a tetanus shot if there is a break in your skin  · A cast, splint, or brace  may be placed on your wrist to decrease movement  These devices will help hold the bones in place while they heal      · Traction  may be needed if your bone broke into 2 pieces  Traction pulls on the bone pieces to pull them back into place  A pin may be put in your bone or cast and hooked to ropes and a pulley  Weight is hung on the rope to help pull on the bones so they will heal correctly  · A closed reduction  is a procedure to put your bones into the correct position without surgery  · Surgery  may be needed to put your bones back into the correct position  Wires, pins, plates or screws may be used to help hold the bones in place  How can I manage my symptoms? · Rest  as much as possible  Do not play contact sports until the healthcare provider says it is okay  · Apply ice  on your wrist for 15 to 20 minutes every hour or as directed  Use an ice pack, or put crushed ice in a plastic bag  Cover it with a towel before you place it on your skin  Ice helps prevent tissue damage and decreases swelling and pain  · Elevate  your wrist above the level of your heart as often as possible  This will help decrease swelling and pain  Prop your wrist on pillows or blankets to keep it elevated comfortably  · Go to physical therapy  as directed  You may need physical therapy after your wrist heals and the cast is removed  A physical therapist can teach you exercises to help improve movement and strength and to decrease pain  When should I seek immediate care? · Your pain gets worse or does not get better after you take pain medicine  · Your cast or splint breaks, gets wet, or is damaged  · Your hand or fingers feel numb or cold  · Your hand or fingers turn white or blue  · Your splint or cast feels too tight       · You have more pain or swelling after the cast or splint is put on  When should I contact my healthcare provider? · You have a fever  · There is a foul smell or blood coming from under the cast     · You have questions or concerns about your condition or care  CARE AGREEMENT:   You have the right to help plan your care  Learn about your health condition and how it may be treated  Discuss treatment options with your caregivers to decide what care you want to receive  You always have the right to refuse treatment  The above information is an  only  It is not intended as medical advice for individual conditions or treatments  Talk to your doctor, nurse or pharmacist before following any medical regimen to see if it is safe and effective for you  © 2017 [address]  Information is for End User's use only and may not be sold, redistributed or otherwise used for commercial purposes  All illustrations and images included in CareNotes® are the copyrighted property of [employer] M , Inc  or [address]  Splint Care   WHAT YOU NEED TO KNOW:   What do I need to know about splint care? Splint care is important to help protect your splint until it comes off  Some splints are made of fiberglass or plaster that will need to dry and harden  Splint care will help the splint dry and harden correctly  Even after your splint hardens, it can be damaged  How do I care for my splint? · Wait for your hard splint to harden completely  You may have to wait up to 3 days before you can walk on a plaster splint  · Check your splint and the skin around it each day  Check your splint for damage, such as cracks and breaks  Check your skin for redness, increased swelling, and sores  Loosen the elastic bandage around your splint if it feels too tight  · Keep your splint clean and dry  Keep dirt out of your splint  Before you bathe, wrap your hard splint with 2 layers of plastic   Then put a plastic bag over it  Keep the plastic bag tightly sealed  You can also ask your healthcare provider about waterproof shields  Do not put your hard splint in the water , even with a plastic bag over it  A wet splint can make your skin itchy, and may lead to infection  · Do not put powders or deodorants inside your splint  These can dry your skin and increase itching  · Do not try to scratch the skin inside your hard splint with sharp objects  Sharp objects can break off inside your splint or hurt your skin  · Do not pull the padding out of your splint  The padding inside your splint protects your skin  You may develop a sore on your skin if you take out the padding  When should I seek immediate care? · You have increased pain  · Your fingers or toes are numb or tingling  · You feel burning or stinging around your injury  · Your nails, fingers, or toes turn pale, blue, or gray, and feel cold  · You have new or increased trouble moving your fingers or toes  · Your swelling gets worse  · The skin under your splint is bleeding or leaking pus  When should I contact my healthcare provider? · Your hard splint gets wet or is damaged  · You have a fever  · Your splint feels tighter  · You have itchy, dry skin under your splint that is getting worse  · The skin under your splint is red, or you have a new sore  · You notice a bad smell coming from your splint  · You have questions or concerns about your condition or care  CARE AGREEMENT:   You have the right to help plan your care  Learn about your health condition and how it may be treated  Discuss treatment options with your caregivers to decide what care you want to receive  You always have the right to refuse treatment  The above information is an  only  It is not intended as medical advice for individual conditions or treatments   Talk to your doctor, nurse or pharmacist before following any medical regimen to see if it is safe and effective for you  © 2017 [address] Information is for End User's use only and may not be sold, redistributed or otherwise used for commercial purposes  All illustrations and images included in CareNotes® are the copyrighted property of A D A M , Inc  or [facility]

## 2018-02-10 NOTE — ED PROCEDURE NOTE
Procedure  Nerve Block  Date/Time: 2/9/2018 7:01 PM  Performed by: Jude Mendez  Authorized by: Randall Barr     Patient location:  ED  Other Assisting Provider: Yes (comment) (Dr Daphne Huang)    Consent:     Consent obtained:  Verbal    Consent given by:  Patient    Risks discussed: Allergic reaction, infection, nerve damage, swelling, unsuccessful block, pain, intravenous injection and bleeding    Alternatives discussed:  No treatment and alternative treatment  Universal protocol:     Procedure explained and questions answered to patient or proxy's satisfaction: yes      Patient identity confirmed:  Verbally with patient and arm band  Indications:     Indications:  Pain relief and procedural anesthesia  Location:     Body area:  Upper extremity    Upper extremity nerve blocked: Supraclavicular  Laterality:  Left  Pre-procedure details:     Skin preparation:  Povidone-iodine  Skin anesthesia (see MAR for exact dosages):     Skin anesthesia method:  None  Procedure details (see MAR for exact dosages): Block needle gauge:  20 G    Guidance: ultrasound      Anesthetic injected:  Lidocaine 2% w/o epi    Steroid injected:  None    Additive injected:  None    Injection procedure:  Anatomic landmarks identified, incremental injection, negative aspiration for blood, anatomic landmarks palpated and introduced needle  Post-procedure details:     Dressing:  None    Outcome:  Anesthesia achieved    Patient tolerance of procedure:   Tolerated well, no immediate complications                       Cole Gloria MD  02/09/18 0087

## 2018-02-13 ENCOUNTER — APPOINTMENT (OUTPATIENT)
Dept: RADIOLOGY | Facility: CLINIC | Age: 63
End: 2018-02-13
Payer: COMMERCIAL

## 2018-02-13 ENCOUNTER — OFFICE VISIT (OUTPATIENT)
Dept: OBGYN CLINIC | Facility: MEDICAL CENTER | Age: 63
End: 2018-02-13
Payer: COMMERCIAL

## 2018-02-13 VITALS
DIASTOLIC BLOOD PRESSURE: 95 MMHG | WEIGHT: 206 LBS | HEART RATE: 78 BPM | SYSTOLIC BLOOD PRESSURE: 180 MMHG | HEIGHT: 64 IN | BODY MASS INDEX: 35.17 KG/M2

## 2018-02-13 DIAGNOSIS — S52.602A CLOSED FRACTURE OF DISTAL ENDS OF LEFT RADIUS AND ULNA, INITIAL ENCOUNTER: Primary | ICD-10-CM

## 2018-02-13 DIAGNOSIS — M25.532 PAIN IN LEFT WRIST: ICD-10-CM

## 2018-02-13 DIAGNOSIS — S52.502A CLOSED FRACTURE OF DISTAL ENDS OF LEFT RADIUS AND ULNA, INITIAL ENCOUNTER: Primary | ICD-10-CM

## 2018-02-13 PROCEDURE — 73110 X-RAY EXAM OF WRIST: CPT

## 2018-02-13 PROCEDURE — 99204 OFFICE O/P NEW MOD 45 MIN: CPT | Performed by: ORTHOPAEDIC SURGERY

## 2018-02-13 NOTE — PROGRESS NOTES
Orthopaedic Surgery - Office Note  Jihan Faustin (27 y o  female)   : 1955   MRN: [id number]  Encounter Date: 2018    Chief Complaint   Patient presents with    Left Wrist - Pain       Assessment / Plan  Displaced L distal radius fracture    · The diagnosis and treatment options were reviewed  · The patient wishes to proceed with ORIF of L distal radius  · The risks, benefits, and alternatives were discussed  · Written consent was obtained  Return for Recheck 10 days post op  History of Present Illness  Jihan Faustin is a 61 y o  LHD female who presents today s/p L wrist fx on 18 when she slipped on the ice at her house when she was refilling her bird feeder  Pt was seen in the [place] [facility] where she was reduced and placed into a splint and a sling which she has been compliant with  Pt has been managing her pain with Tylenol, she describes a throbbing pain that she will occasionally feel in her forearm  Pt c/o minor swelling and numbness into her thumb which she states gets better when she tried to move it or rub it  Review of Systems  Pertinent items are noted in HPI  All other systems were reviewed and are negative  Physical Exam  BP (!) 180/95   Pulse 78   Ht 5' 4" (1 626 m)   Wt 93 4 kg (206 lb)   BMI 35 36 kg/m²   Cons: Appears well  No apparent distress  Psych: Alert  Oriented x3  Mood and affect normal   Eyes: PERRLA, EOMI  Resp: Normal effort  No audible wheezing or stridor  CV: Palpable pulse  No discernable arrhythmia  No LE edema  Lymph:  No palpable cervical, axillary, or inguinal lymphadenopathy  Skin: Warm  No palpable masses  No visible lesions  Neuro: Normal muscle tone  Normal and symmetric DTR's  Left Hand & Wrist Exam  Alignment:  Normal resting hand posture  Inspection:  patient was in splint during her office visit today  Palpation:  patient is in her splint for her office visit today  ROM:  Not tested  Strength:  Not tested    Stability: Not tested  Tests:  No pertinent positive or negative tests  Neurovascular:  Sensation intact in Ax/R/M/U nerve distributions  2+ radial pulse  Brisk capillary refill in all fingertips  Studies Reviewed  XR of left wrist - displaced distal radius fx    Procedures  No procedures today  Medical, Surgical, Family, and Social History  The patient's medical history, family history, and social history, were reviewed and updated as appropriate  Past Medical History:   Diagnosis Date    Allergic rhinitis     DVT of lower extremity (deep venous thrombosis) ([facility] ) 2014    GERD (gastroesophageal reflux disease)     History of acute bronchitis     Pulmonary embolism ([facility] ) 2014       Past Surgical History:   Procedure Laterality Date    [place] COLONOSCOPY FLX DX W/COLLJ SPEC WHEN PFRMD N/A 9/29/2017    Procedure: EGD with bx  AND COLONOSCOPY;  Surgeon: Kendy Boyer DO;  Location: [facility]; Service: Gastroenterology       History reviewed  No pertinent family history  Social History     Occupational History    Not on file       Social History Main Topics    Smoking status: Never Smoker    Smokeless tobacco: Never Used    Alcohol use Yes      Comment: socially    Drug use: No    Sexual activity: Not on file       Allergies   Allergen Reactions    Sulfa Antibiotics Rash         Current Outpatient Prescriptions:     famotidine (PEPCID) 20 mg tablet, Take 20 mg by mouth daily  , Disp: , Rfl:     FLUTICASONE PROPIONATE NA, into each nostril, Disp: , Rfl:     omeprazole (PriLOSEC) 40 MG capsule, Take 1 capsule by mouth 2 (two) times a day, Disp: , Rfl:     rivaroxaban (XARELTO) 20 mg tablet, Take 20 mg by mouth daily with breakfast, Disp: , Rfl:       Andres Hoff Attestation    I,:   Andres Hoff am acting as a scribe while in the presence of the attending physician :        I,:   Paulo Ling MD personally performed the services described in this documentation    as scribed in my presence :

## 2018-02-13 NOTE — PATIENT INSTRUCTIONS
ORIF of a Wrist Fracture   WHAT YOU NEED TO KNOW:   What do I need to know about open reduction and internal fixation (ORIF) of a wrist fracture? ORIF of a wrist fracture is surgery to fix a broken wrist  Medical plates, screws, pins, or wires will be used to hold the bones in place while they heal    How do I prepare for surgery? Your healthcare provider will talk to you about how to prepare for surgery  He may tell you not to eat or drink anything after midnight on the day of your surgery  He will tell you what medicines to take or not take on the day of your surgery  What will happen during surgery? General anesthesia or a nerve block will be used to keep you free from pain during surgery  Your surgeon will make one or more incisions on your wrist  He will use medical plates, screws, pins, or wires to hold the broken bones together  A bone graft may be placed in or around the fracture to strengthen your wrist  X-rays may be taken during surgery to make sure the broken bone is set properly  X-rays also show if the pins, plates, and screws are placed correctly  Your surgeon will close your incision with stitches or staples  A splint will be placed over your wrist to keep the bones in place while they heal    What are the risks of surgery? You may bleed more than expected or get an infection  Your tendons and nerves may get injured during or after surgery  Your broken wrist may not heal properly  You may continue to have wrist pain  CARE AGREEMENT:   You have the right to help plan your care  Learn about your health condition and how it may be treated  Discuss treatment options with your caregivers to decide what care you want to receive  You always have the right to refuse treatment  The above information is an  only  It is not intended as medical advice for individual conditions or treatments   Talk to your doctor, nurse or pharmacist before following any medical regimen to see if it is safe and effective for you  © 2017 [employer] Information is for End User's use only and may not be sold, redistributed or otherwise used for commercial purposes  All illustrations and images included in CareNotes® are the copyrighted property of [employer]  or Ryan [employer]elius

## 2018-02-16 ENCOUNTER — ANESTHESIA EVENT (OUTPATIENT)
Dept: PERIOP | Facility: HOSPITAL | Age: 63
End: 2018-02-16
Payer: COMMERCIAL

## 2018-02-19 ENCOUNTER — HOSPITAL ENCOUNTER (OUTPATIENT)
Dept: RADIOLOGY | Facility: HOSPITAL | Age: 63
Setting detail: OUTPATIENT SURGERY
Discharge: HOME | End: 2018-02-19
Payer: COMMERCIAL

## 2018-02-19 ENCOUNTER — HOSPITAL ENCOUNTER (OUTPATIENT)
Facility: HOSPITAL | Age: 63
Setting detail: OUTPATIENT SURGERY
Discharge: HOME | End: 2018-02-19
Attending: ORTHOPAEDIC SURGERY | Admitting: ORTHOPAEDIC SURGERY
Payer: COMMERCIAL

## 2018-02-19 ENCOUNTER — ANESTHESIA (OUTPATIENT)
Dept: PERIOP | Facility: HOSPITAL | Age: 63
End: 2018-02-19
Payer: COMMERCIAL

## 2018-02-19 VITALS
HEART RATE: 81 BPM | SYSTOLIC BLOOD PRESSURE: 123 MMHG | HEIGHT: 64 IN | OXYGEN SATURATION: 97 % | RESPIRATION RATE: 20 BRPM | TEMPERATURE: 97.6 F | BODY MASS INDEX: 35.17 KG/M2 | WEIGHT: 206 LBS | DIASTOLIC BLOOD PRESSURE: 58 MMHG

## 2018-02-19 DIAGNOSIS — S52.602A CLOSED FRACTURE OF DISTAL ENDS OF LEFT RADIUS AND ULNA, INITIAL ENCOUNTER: ICD-10-CM

## 2018-02-19 DIAGNOSIS — S52.502A CLOSED FRACTURE OF DISTAL ENDS OF LEFT RADIUS AND ULNA, INITIAL ENCOUNTER: ICD-10-CM

## 2018-02-19 DIAGNOSIS — S52.502D CLOSED FRACTURE OF DISTAL ENDS OF LEFT RADIUS AND ULNA WITH ROUTINE HEALING, SUBSEQUENT ENCOUNTER: Primary | ICD-10-CM

## 2018-02-19 DIAGNOSIS — S52.602D CLOSED FRACTURE OF DISTAL ENDS OF LEFT RADIUS AND ULNA WITH ROUTINE HEALING, SUBSEQUENT ENCOUNTER: Primary | ICD-10-CM

## 2018-02-19 PROCEDURE — C1713 ANCHOR/SCREW BN/BN,TIS/BN: HCPCS | Performed by: ORTHOPAEDIC SURGERY

## 2018-02-19 PROCEDURE — 25608 OPTX DST RD XART FX/EPI SEP2: CPT | Performed by: PHYSICIAN ASSISTANT

## 2018-02-19 PROCEDURE — 73110 X-RAY EXAM OF WRIST: CPT

## 2018-02-19 PROCEDURE — 25608 OPTX DST RD XART FX/EPI SEP2: CPT | Performed by: ORTHOPAEDIC SURGERY

## 2018-02-19 DEVICE — IMPLANTABLE DEVICE
Type: IMPLANTABLE DEVICE | Site: WRIST | Status: FUNCTIONAL
Brand: PEG SMOOTH

## 2018-02-19 DEVICE — IMPLANTABLE DEVICE
Type: IMPLANTABLE DEVICE | Site: WRIST | Status: FUNCTIONAL
Brand: DVR ANATOMIC NARROW SHORT LEFT

## 2018-02-19 DEVICE — IMPLANTABLE DEVICE
Type: IMPLANTABLE DEVICE | Site: WRIST | Status: FUNCTIONAL
Brand: CORTICAL SCREW

## 2018-02-19 RX ORDER — PROPOFOL 10 MG/ML
INJECTION, EMULSION INTRAVENOUS AS NEEDED
Status: DISCONTINUED | OUTPATIENT
Start: 2018-02-19 | End: 2018-02-19 | Stop reason: SURG

## 2018-02-19 RX ORDER — OXYCODONE HYDROCHLORIDE AND ACETAMINOPHEN 5; 325 MG/1; MG/1
2 TABLET ORAL EVERY 4 HOURS PRN
Status: DISCONTINUED | OUTPATIENT
Start: 2018-02-19 | End: 2018-02-19 | Stop reason: HOSPADM

## 2018-02-19 RX ORDER — SODIUM CHLORIDE 9 MG/ML
125 INJECTION, SOLUTION INTRAVENOUS CONTINUOUS
Status: DISCONTINUED | OUTPATIENT
Start: 2018-02-19 | End: 2018-02-19 | Stop reason: HOSPADM

## 2018-02-19 RX ORDER — PROMETHAZINE HYDROCHLORIDE 12.5 MG/1
12.5 TABLET ORAL EVERY 6 HOURS PRN
Qty: 30 TABLET | Refills: 0 | Status: SHIPPED | OUTPATIENT
Start: 2018-02-19 | End: 2018-03-28 | Stop reason: HOSPADM

## 2018-02-19 RX ORDER — ACETAMINOPHEN 325 MG/1
1000 TABLET ORAL 2 TIMES DAILY
COMMUNITY

## 2018-02-19 RX ORDER — ROPIVACAINE HYDROCHLORIDE 5 MG/ML
INJECTION, SOLUTION EPIDURAL; INFILTRATION; PERINEURAL AS NEEDED
Status: DISCONTINUED | OUTPATIENT
Start: 2018-02-19 | End: 2018-02-19 | Stop reason: SURG

## 2018-02-19 RX ORDER — FENTANYL CITRATE/PF 50 MCG/ML
25 SYRINGE (ML) INJECTION
Status: DISCONTINUED | OUTPATIENT
Start: 2018-02-19 | End: 2018-02-19 | Stop reason: HOSPADM

## 2018-02-19 RX ORDER — ONDANSETRON 2 MG/ML
INJECTION INTRAMUSCULAR; INTRAVENOUS AS NEEDED
Status: DISCONTINUED | OUTPATIENT
Start: 2018-02-19 | End: 2018-02-19 | Stop reason: SURG

## 2018-02-19 RX ORDER — PROPOFOL 10 MG/ML
INJECTION, EMULSION INTRAVENOUS CONTINUOUS PRN
Status: DISCONTINUED | OUTPATIENT
Start: 2018-02-19 | End: 2018-02-19 | Stop reason: SURG

## 2018-02-19 RX ORDER — ONDANSETRON 2 MG/ML
4 INJECTION INTRAMUSCULAR; INTRAVENOUS ONCE
Status: DISCONTINUED | OUTPATIENT
Start: 2018-02-19 | End: 2018-02-19 | Stop reason: HOSPADM

## 2018-02-19 RX ORDER — OXYCODONE HYDROCHLORIDE 5 MG/1
5 TABLET ORAL EVERY 4 HOURS PRN
Qty: 30 TABLET | Refills: 0 | Status: SHIPPED | OUTPATIENT
Start: 2018-02-19 | End: 2018-03-01

## 2018-02-19 RX ORDER — OXYCODONE HYDROCHLORIDE AND ACETAMINOPHEN 5; 325 MG/1; MG/1
1 TABLET ORAL EVERY 4 HOURS PRN
Status: DISCONTINUED | OUTPATIENT
Start: 2018-02-19 | End: 2018-02-19 | Stop reason: HOSPADM

## 2018-02-19 RX ORDER — FENTANYL CITRATE 50 UG/ML
INJECTION, SOLUTION INTRAMUSCULAR; INTRAVENOUS AS NEEDED
Status: DISCONTINUED | OUTPATIENT
Start: 2018-02-19 | End: 2018-02-19 | Stop reason: SURG

## 2018-02-19 RX ORDER — MAGNESIUM HYDROXIDE 1200 MG/15ML
LIQUID ORAL AS NEEDED
Status: DISCONTINUED | OUTPATIENT
Start: 2018-02-19 | End: 2018-02-19 | Stop reason: HOSPADM

## 2018-02-19 RX ORDER — MEPERIDINE HYDROCHLORIDE 50 MG/ML
12.5 INJECTION INTRAMUSCULAR; INTRAVENOUS; SUBCUTANEOUS
Status: DISCONTINUED | OUTPATIENT
Start: 2018-02-19 | End: 2018-02-19 | Stop reason: HOSPADM

## 2018-02-19 RX ORDER — MIDAZOLAM HYDROCHLORIDE 1 MG/ML
INJECTION INTRAMUSCULAR; INTRAVENOUS AS NEEDED
Status: DISCONTINUED | OUTPATIENT
Start: 2018-02-19 | End: 2018-02-19 | Stop reason: SURG

## 2018-02-19 RX ORDER — MORPHINE SULFATE 2 MG/ML
2 INJECTION, SOLUTION INTRAMUSCULAR; INTRAVENOUS EVERY 4 HOURS PRN
Status: DISCONTINUED | OUTPATIENT
Start: 2018-02-19 | End: 2018-02-19 | Stop reason: HOSPADM

## 2018-02-19 RX ADMIN — FENTANYL CITRATE 100 MCG: 50 INJECTION, SOLUTION INTRAMUSCULAR; INTRAVENOUS at 12:11

## 2018-02-19 RX ADMIN — SODIUM CHLORIDE: 0.9 INJECTION, SOLUTION INTRAVENOUS at 12:38

## 2018-02-19 RX ADMIN — ONDANSETRON HYDROCHLORIDE 4 MG: 2 INJECTION, SOLUTION INTRAVENOUS at 12:57

## 2018-02-19 RX ADMIN — FENTANYL CITRATE 100 MCG: 50 INJECTION, SOLUTION INTRAMUSCULAR; INTRAVENOUS at 10:38

## 2018-02-19 RX ADMIN — PROPOFOL 120 MCG/KG/MIN: 10 INJECTION, EMULSION INTRAVENOUS at 12:16

## 2018-02-19 RX ADMIN — PROPOFOL 50 MG: 10 INJECTION, EMULSION INTRAVENOUS at 12:11

## 2018-02-19 RX ADMIN — ROPIVACAINE HYDROCHLORIDE 30 ML: 5 INJECTION, SOLUTION EPIDURAL; INFILTRATION; PERINEURAL at 10:38

## 2018-02-19 RX ADMIN — PROPOFOL 50 MG: 10 INJECTION, EMULSION INTRAVENOUS at 12:23

## 2018-02-19 RX ADMIN — CEFAZOLIN SODIUM 2000 MG: 1 SOLUTION INTRAVENOUS at 12:07

## 2018-02-19 RX ADMIN — SODIUM CHLORIDE 125 ML/HR: 0.9 INJECTION, SOLUTION INTRAVENOUS at 09:33

## 2018-02-19 RX ADMIN — MIDAZOLAM HYDROCHLORIDE 2 MG: 1 INJECTION, SOLUTION INTRAMUSCULAR; INTRAVENOUS at 10:38

## 2018-02-19 NOTE — DISCHARGE INSTRUCTIONS
POSTOPERATIVE INSTRUCTIONS    MEDICATIONS:  · Resume all home medications unless otherwise instructed by your surgeon  · Pain Medication:  Oxycodone 5 mg, 1-3 tablets every 3 hours as needed  · If you were given a regional anesthetic (nerve block), please begin taking the pain medication as soon as you get home, even if you have minimal or no pain  DO NOT WAIT FOR THE NERVE BLOCK TO WEAR OFF  · Possible side effects include nausea, constipation, and urinary retention  If you experience these side effects, please call our office for assistance  · Pain med refills are authorized only during office hours (8am-4pm, Mon-Fri)  · Anti-Inflammatory:  Naproxen 500 mg, 1 tablet every 12 hours for 4 weeks  · Take with food  Stop if you experience nausea, reflux, or stomach pain  · Nausea Medication:  Promethazine 12 5 mg, 1 tablet every 6 hours as needed  · Fill prescription ONLY if you expericnce severe nausea  WOUND CARE:  · Keep the dressing clean and dry  Light drainage may occur the first 2 days postop  · DO NOT REMOVE THE DRESSINGS until you are seen in our office  Cover the bandages appropriately while washing to keep them from getting wet  · Please call our office ([phone]) if you experience any of the following:  · Sudden increase in swelling, redness, or warmth at the surgical site  · Excessive incisional drainage that persists beyond the 3rd day after surgery  · Oral temperature greater than 101 degrees, not relieved with Tylenol  · Shortness of breath, chest pain, nausea, or any other concerning symptoms    SWELLING CONTROL:  · Cold Therapy:  Apply ice (20 min on, 20 min off) as often as you feel is necessary  · Elevation:  Keep your arm at or above heart level as much as possible  IMMOBILIZATION:  · DO NOT REMOVE YOUR SPLINT  Keep it clean and dry  ACTIVITY:  · DO NOT lift, carry, push, or pull anything with your arm until cleared by your surgeon    · Wrist / Finger Motion:  Move your wrist and fingers (if not immobilized in splint) through a full range of motion 20 times per hour while awake  PHYSICAL THERAPY:  · You will not need physical therapy  FOLLOW-UP APPOINTMENT:  · 10-14 days after surgery with:    [facility]  [address] [place] [address], [address] [place] [address], [facility], [address]   [phone] ([facility])  [phone] (After Hours)            Promethazine (By mouth)   Promethazine (proe-METH-a-zeen)  Treats allergies and motion sickness  Also used before and after surgery and other procedures as a sedative and to control pain or nausea and vomiting  This medicine is a phenothiazine  Brand Name(s):   There may be other brand names for this medicine  When This Medicine Should Not Be Used: This medicine is not right for everyone  Do not use it if you had an allergic reaction to promethazine or another phenothiazine medicine, or while you are having asthma symptoms or similar breathing problems  How to Use This Medicine:   Tablet, Liquid  · Your doctor will tell you how much medicine to use  Do not use more than directed  · Measure the oral liquid medicine with a marked measuring spoon, oral syringe, or medicine cup  · Missed dose: Take a dose as soon as you remember  If it is almost time for your next dose, wait until then and take a regular dose  Do not take extra medicine to make up for a missed dose  · Store the medicine in a closed container at room temperature, away from heat, moisture, and direct light  Do not freeze the oral liquid  Drugs and Foods to Avoid:   Ask your doctor or pharmacist before using any other medicine, including over-the-counter medicines, vitamins, and herbal products  · Some medicines can affect how promethazine works  Tell your doctor if you are also using an MAO inhibitor (MAOI)  · Tell your doctor if you use anything else that makes you sleepy   Some examples are allergy medicine, narcotic pain medicine, and alcohol  Warnings While Using This Medicine:   · Tell your doctor if you are pregnant or breastfeeding, or if you have liver disease, heart or blood vessel disease, glaucoma, a stomach ulcer, bowel problems, an enlarged prostate, bone marrow problems, trouble urinating, or seizures  Also tell your doctor if you have breathing problems, such as COPD, asthma, or sleep apnea  · This medicine may cause the following problems:  ¨ Breathing problems, which could be life-threatening  ¨ Neuroleptic malignant syndrome (a nerve disorder that can be life-threatening)  ¨ Liver problems  · Use in children: Give the medicine exactly as directed by the child's doctor  Too much of this medicine can cause death in a young child  Do not give this medicine to a child younger than 3years old, unless your doctor tells you to  · This medicine may make you dizzy or drowsy  Do not drive or do anything that could be dangerous until you know how this medicine affects you  · Tell any doctor or dentist who treats you that you are using this medicine  This medicine may affect certain medical test results  · This medicine may make your skin more sensitive to sunlight  Wear sunscreen  Do not use sunlamps or tanning beds  · Keep all medicine out of the reach of children  Never share your medicine with anyone    Possible Side Effects While Using This Medicine:   Call your doctor right away if you notice any of these side effects:  · Allergic reaction: Itching or hives, swelling in your face or hands, swelling or tingling in your mouth or throat, chest tightness, trouble breathing  · Fever, sweating, confusion, uneven heartbeat, muscle stiffness  · Lightheadedness or fainting  · Seeing or hearing things that are not there (especially in children)  · Seizures  · Trouble breathing, slow breathing  · Twitching or muscle movements you cannot control  · Yellow skin or eyes  If you notice these less serious side effects, talk with your doctor:   · Blurred vision  · Nausea, vomiting, constipation  If you notice other side effects that you think are caused by this medicine, tell your doctor  Call your doctor for medical advice about side effects  You may report side effects to FDA at 3-407-FDA-1272  © 2017 [employer] Information is for End User's use only and may not be sold, redistributed or otherwise used for commercial purposes  The above information is an  only  It is not intended as medical advice for individual conditions or treatments  Talk to your doctor, nurse or pharmacist before following any medical regimen to see if it is safe and effective for you  Oxycodone, Rapid Release (By mouth)   Oxycodone Hydrochloride (dc-g-ARO-done le-droe-KLOR-brittany)  Treats moderate to severe pain  This medicine is a narcotic pain reliever  Brand Name(s): Oxaydo, Oxy IR, Roxicodone   There may be other brand names for this medicine  When This Medicine Should Not Be Used: This medicine is not right for everyone  Do not use it if you had an allergic reaction to oxycodone, codeine, hydrocodone, dihydrocodeine, or morphine, or you have a stomach or bowel blockage  How to Use This Medicine:   Capsule, Liquid, Tablet  · Take your medicine as directed  Your dose may need to be changed several times to find what works best for you  · An overdose can be dangerous  Follow directions carefully so you do not get too much medicine at one time  · Oral liquid: Measure the oral liquid medicine with a marked measuring spoon, oral syringe, or medicine cup  · Oxaydo® tablet: Swallow it whole with enough water to swallow it completely  Do not break, crush, chew, or dissolve it  Do not wet the tablet before you put it in your mouth  · This medicine should come with a Medication Guide  Ask your pharmacist for a copy if you do not have one  · Missed dose: Take a dose as soon as you remember   If it is almost time for your next dose, wait until then and take a regular dose  Do not take extra medicine to make up for a missed dose  · Store the medicine in a closed container at room temperature, away from heat, moisture, and direct light  Store the medicine in a secure place to prevent others from getting it  Ask your pharmacist about the best way to dispose of medicine you do not use  Drugs and Foods to Avoid:   Ask your doctor or pharmacist before using any other medicine, including over-the-counter medicines, vitamins, and herbal products  · Do not use this medicine if you are using or have used an MAO inhibitor within the past 14 days  · Some medicines can affect how oxycodone works  Tell your doctor if you are using any of the following:  ¨ Amiodarone, carbamazepine, erythromycin, ketoconazole, phenytoin, quinidine, rifampin, ritonavir  ¨ Diuretic (water pill)  ¨ Medicine to treat depression or anxiety  ¨ Medicine to treat migraine headaches  ¨ Phenothiazine medicine  · Tell your doctor if you use anything else that makes you sleepy  Some examples are allergy medicine, narcotic pain medicine, and alcohol  Tell your doctor if you are using buprenorphine, butorphanol, nalbuphine, pentazocine, or a muscle relaxer  · Do not drink alcohol while you are using this medicine  Warnings While Using This Medicine:   · Tell your doctor if you are pregnant or breastfeeding, or if you have kidney disease, liver disease, heart disease, low blood pressure, lung disease or breathing problems (such as asthma, COPD), scoliosis, an enlarged prostate or trouble urinating, an underactive thyroid, [place] disease, gallbladder or pancreas problems, or digestion problems  Tell your doctor if you have a history of head injury, brain tumor, mental health problems, seizures, or alcohol or drug addiction    · This medicine may cause the following problems:  ¨ High risk of overdose, which can lead to death  ¨ Respiratory depression (serious breathing problem that can be life-threatening)  ¨ Serotonin syndrome, when used with certain medicines  · This medicine may make you dizzy, drowsy, or faint  Do not drive or do anything else that could be dangerous until you know how this medicine affects you  Sit or lie down if you feel dizzy  Stand up carefully  · This medicine can be habit-forming  Do not use more than your prescribed dose  Call your doctor if you think your medicine is not working  · Do not stop using this medicine suddenly  Your doctor will need to slowly decrease your dose before you stop it completely  · This medicine may cause constipation, especially with long-term use  Ask your doctor if you should use a laxative to prevent and treat constipation  Drink plenty of liquids to help avoid constipation  · This medicine could cause infertility  Talk with your doctor before using this medicine if you plan to have children  · Keep all medicine out of the reach of children  Never share your medicine with anyone  Possible Side Effects While Using This Medicine:   Call your doctor right away if you notice any of these side effects:  · Allergic reaction: Itching or hives, swelling in your face or hands, swelling or tingling in your mouth or throat, chest tightness, trouble breathing  · Anxiety, restlessness, fast heartbeat, fever, sweating, muscle spasms, twitching, nausea, vomiting, diarrhea, seeing or hearing things that are not there  · Blue lips, fingernails, or skin, trouble breathing  · Extreme dizziness or weakness, shallow breathing, slow heartbeat, sweating, cold or clammy skin, seizures  · Lightheadedness, dizziness, fainting  · Severe constipation, stomach pain  If you notice these less serious side effects, talk with your doctor:   · Mild constipation  · Sleepiness, tiredness  If you notice other side effects that you think are caused by this medicine, tell your doctor  Call your doctor for medical advice about side effects   You may report side effects to FDA at 1-800-FDA-1088  © 2017 [employer] Information is for End User's use only and may not be sold, redistributed or otherwise used for commercial purposes  The above information is an  only  It is not intended as medical advice for individual conditions or treatments  Talk to your doctor, nurse or pharmacist before following any medical regimen to see if it is safe and effective for you

## 2018-02-19 NOTE — H&P (VIEW-ONLY)
Orthopaedic Surgery - Office Note  Sasha Grider (25 y o  female)   : 1955   MRN: [id number]  Encounter Date: 2018    Chief Complaint   Patient presents with    Left Wrist - Pain       Assessment / Plan  Displaced L distal radius fracture    · The diagnosis and treatment options were reviewed  · The patient wishes to proceed with ORIF of L distal radius  · The risks, benefits, and alternatives were discussed  · Written consent was obtained  Return for Recheck 10 days post op  History of Present Illness  Sasha Grider is a 61 y o  LHD female who presents today s/p L wrist fx on 18 when she slipped on the ice at her house when she was refilling her bird feeder  Pt was seen in the [place] ED where she was reduced and placed into a splint and a sling which she has been compliant with  Pt has been managing her pain with Tylenol, she describes a throbbing pain that she will occasionally feel in her forearm  Pt c/o minor swelling and numbness into her thumb which she states gets better when she tried to move it or rub it  Review of Systems  Pertinent items are noted in HPI  All other systems were reviewed and are negative  Physical Exam  BP (!) 180/95   Pulse 78   Ht 5' 4" (1 626 m)   Wt 93 4 kg (206 lb)   BMI 35 36 kg/m²   Cons: Appears well  No apparent distress  Psych: Alert  Oriented x3  Mood and affect normal   Eyes: PERRLA, EOMI  Resp: Normal effort  No audible wheezing or stridor  CV: Palpable pulse  No discernable arrhythmia  No LE edema  Lymph:  No palpable cervical, axillary, or inguinal lymphadenopathy  Skin: Warm  No palpable masses  No visible lesions  Neuro: Normal muscle tone  Normal and symmetric DTR's  Left Hand & Wrist Exam  Alignment:  Normal resting hand posture  Inspection:  patient was in splint during her office visit today  Palpation:  patient is in her splint for her office visit today  ROM:  Not tested  Strength:  Not tested    Stability: Not tested  Tests:  No pertinent positive or negative tests  Neurovascular:  Sensation intact in Ax/R/M/U nerve distributions  2+ radial pulse  Brisk capillary refill in all fingertips  Studies Reviewed  XR of left wrist - displaced distal radius fx    Procedures  No procedures today  Medical, Surgical, Family, and Social History  The patient's medical history, family history, and social history, were reviewed and updated as appropriate  Past Medical History:   Diagnosis Date    Allergic rhinitis     [facility] of lower extremity [facility]deep venous thrombosis) ([facility] ) 2014    GERD (gastroesophageal reflux disease)     History of acute bronchitis     Pulmonary embolism ([facility] ) 2014       Past Surgical History:   Procedure Laterality Date    [place] COLONOSCOPY FLX DX W/COLLJ SPEC WHEN PFRMD N/A 9/29/2017    Procedure: EGD with bx  AND COLONOSCOPY;  Surgeon: Annmarie Markham DO;  Location: [facility]; Service: Gastroenterology       History reviewed  No pertinent family history  Social History     Occupational History    Not on file       Social History Main Topics    Smoking status: Never Smoker    Smokeless tobacco: Never Used    Alcohol use Yes      Comment: socially    Drug use: No    Sexual activity: Not on file       Allergies   Allergen Reactions    Sulfa Antibiotics Rash         Current Outpatient Prescriptions:     famotidine (PEPCID) 20 mg tablet, Take 20 mg by mouth daily  , Disp: , Rfl:     FLUTICASONE PROPIONATE NA, into each nostril, Disp: , Rfl:     omeprazole (PriLOSEC) 40 MG capsule, Take 1 capsule by mouth 2 (two) times a day, Disp: , Rfl:     rivaroxaban (XARELTO) 20 mg tablet, Take 20 mg by mouth daily with breakfast, Disp: , Rfl:       Andres Harrington    Scribe Attestation    I,:   Andres Harrington am acting as a scribe while in the presence of the attending physician :        I,:   Josse Montero MD personally performed the services described in this documentation    as scribed in my presence :

## 2018-02-19 NOTE — ANESTHESIA POSTPROCEDURE EVALUATION
Post-Op Assessment Note      CV Status:  Stable    Mental Status:  Alert and awake    Hydration Status:  Euvolemic    PONV Controlled:  Controlled    Airway Patency:  Patent    Post Op Vitals Reviewed: Yes          Staff: Anesthesiologist           /63 (02/19/18 1324)    Temp (P) 97 6 °F (36 4 °C) (02/19/18 1336)    Pulse 78 (02/19/18 1324)   Resp 12 (02/19/18 1324)    SpO2 (P) 95 % (02/19/18 1336)

## 2018-02-19 NOTE — ANESTHESIA PROCEDURE NOTES
Peripheral Block    Start time: 2/19/2018 10:38 AM  End time: 2/19/2018 10:45 AM  Staffing  Anesthesiologist: Riley Morse  Performed: anesthesiologist   Preanesthetic Checklist  Completed: patient identified, site marked, surgical consent, pre-op evaluation, timeout performed, IV checked, risks and benefits discussed and monitors and equipment checked  Peripheral Block  Patient position: supine  Prep: ChloraPrep  Patient monitoring: heart rate, cardiac monitor, continuous pulse ox and frequent blood pressure checks  Block type: supraclavicular  Laterality: left  Injection technique: single-shot  ultrasound permanent image saved    Local infiltration: ropivacaine  Infiltration strength: 0 5 %  Dose: 30 mL  Needle  Needle type: Stimuplex   Needle gauge: 22 G  Needle length: 5 cm  Needle localization: ultrasound guidance  Test dose: negative  Assessment  Injection assessment: incremental injection  Paresthesia pain: none  Heart rate change: no  Slow fractionated injection: yes  Post-procedure:  site cleaned  patient tolerated the procedure well with no immediate complications

## 2018-02-19 NOTE — OP NOTE
OPERATIVE REPORT    PATIENT NAME: Fabiana Larios   :  1955  MRN: [id number]  Pt Location: [facility] ROOM 02    SURGERY DATE: 2018    SURGEON(S) and ROLE:  Primary: Tamela Hunt MD  Assisting: Pat Garrido PA-C    NOTE:  The presence of a physician assistant was necessary to help with patient positioning, surgical exposure, wound retraction, wound closure, and other key portions of the procedure  No qualified resident was available for this case  PREOPERATIVE DIAGNOSES:  Left Distal Radius Fracture    POSTOPERATIVE DIAGNOSES:  Same as preoperative diagnoses    PROCEDURES:  ORIF Left Distal Radius Fracture      ANESTHESIA TYPE:  Supraclavicular block and Conscious sedation    ANESTHESIA STAFF:   Anesthesiologist: Jonny Roa DO  CRNA: Maida Schirmer, CRNA    ESTIMATED BLOOD LOSS:  10 mL    TOURNIQUET TIME:  Not used    PERIOPERATIVE ANTIBIOTICS:  cefazolin, 2 grams    IMPLANTS:  [employer] 3-hole narrow DVR plate      Implant Name Type Inv  Item Serial No   Lot No  LRB No  Used Action   DVR ANATOMIC NARW SHORT LEFT DVRANSL - [id number]  DVR ANATOMIC NARW SHORT LEFT DVRANSL  [employer]  Left 1 Implanted   PEG SMOOTH 2 X 20MM - [id number]  PEG SMOOTH 2 X 20MM  [employer]  Left 5 Implanted   PEG SMOOTH 2 X 18MM - [id number]  PEG SMOOTH 2 X 18MM  [employer]  Left 1 Implanted   SCREW DEPUY CORTIC [id number] - [id number]  SCREW DEPUY CORTIC [id number]  [employer]  Left 2 Implanted       SPECIMENS:  * No specimens in log *    DRAINS:  None      OPERATIVE INDICATIONS:  The patient is a 61 y o  female with left wrist pain due to distal radius fracture  Surgical treatment was indicated due to instability, displacement and liklihood of prolonged or permanent functional impairment with non-surgical treatment  After a thorough discussion of the potential risks, benefits, and alternative treatments, the patient agreed to proceed with surgery    The patient understands that the risks of surgery include, but are not limited to: nonunion, malunion, loss of fixation, infection, neurovascular injury, wound healing complications, venous thromboembolism, persistent pain, stiffness, instability, recurrence of symptoms, potential need for additional surgeries, and loss of limb or life  Oral and written consent for surgery was obtained from the patient preoperatively  PROCEDURE AND TECHNIQUE:  On the day of surgery, the patient was identified in the preoperative holding area  The operative site was marked by the surgeon  The patient was taken into the operating room  A time-out was conducted to confirm the patient's identity, the operative site, and the proposed procedure  The patient was anesthetized, and perioperative antibiotics were administered  The patient was positioned supine on the OR table  All bony prominences were padded  A tourniquet was not used  The operative site was prepped and draped using standard sterile technique  A volar approach was made to the wrist   The FCR tendon was mobilized and retracted radially  The floor of the FCR sheath was incised, and the FPL was retracted ulnarly  The pronator quadratus was incised along the distal and radial borders of the radius, and elevated subperiosteally  The fracture was impacted, displaced, angulated, intra-articular and with 2 articular segments  The fracture site was debrided and irrigated to remove hematoma and soft callus  The fracture was reduced manually and held provisionally with a  K-wire placed through the radial styloid  Appropriate fracture alignment was confirmed with fluoroscopy  A DVR plate was positioned over the fracture site and provisionally secured  Appropriate hardware position was confirmed with fluoroscopy  The plate was secured distally first with smooth locking pegs, then secured proximally with two 3 5 mm bicortical nonlocking screws   All screws achieved excellent purchase    The distal radioulnar joint was stable  Final fluoroscopic images confirmed appropriate fracture alignment and hardware position  The wound was irrigated with sterile saline  The pronator quadratus  was not repaired  The skin was closed with 3-0 vicryl and 3-0 nylon sutures  A sterile dressing was applied  The drapes were removed and the patient was given a volar wrist splint and arm sling  The patient was awakened from anesthesia and taken to the PACU in stable condition        COMPLICATIONS:  None    PATIENT DISPOSITION:  PACU       SIGNATURE:  Ned Siddiqui MD  DATE:  February 19, 2018  TIME:  1:02 PM

## 2018-02-19 NOTE — ANESTHESIA PREPROCEDURE EVALUATION
Review of Systems/Medical History  Patient summary reviewed  Chart reviewed      Cardiovascular  DVT  PE,  Pulmonary       GI/Hepatic    GERD well controlled,             Endo/Other    Obesity  morbid obesity   GYN       Hematology  Negative hematology ROS      Musculoskeletal       Neurology  Negative neurology ROS      Psychology   Negative psychology ROS              Physical Exam    Airway    Mallampati score: II  TM Distance: >3 FB  Neck ROM: full     Dental   No notable dental hx     Cardiovascular  Rhythm: regular,     Pulmonary  Breath sounds clear to auscultation,     Other Findings        Anesthesia Plan  ASA Score- 2     Anesthesia Type- general with ASA Monitors  Additional Monitors:   Airway Plan:     Comment: Block preop  Plan Factors-Patient not instructed to abstain from smoking on day of procedure  Patient did not smoke on day of surgery  Induction- intravenous  Postoperative Plan- Plan for postoperative opioid use  Informed Consent- Anesthetic plan and risks discussed with patient

## 2018-02-28 ENCOUNTER — OFFICE VISIT (OUTPATIENT)
Dept: OBGYN CLINIC | Facility: OTHER | Age: 63
End: 2018-02-28

## 2018-02-28 VITALS
DIASTOLIC BLOOD PRESSURE: 84 MMHG | WEIGHT: 200 LBS | HEIGHT: 64 IN | BODY MASS INDEX: 34.15 KG/M2 | SYSTOLIC BLOOD PRESSURE: 136 MMHG | HEART RATE: 78 BPM

## 2018-02-28 DIAGNOSIS — S52.602D CLOSED FRACTURE OF DISTAL ENDS OF LEFT RADIUS AND ULNA WITH ROUTINE HEALING, SUBSEQUENT ENCOUNTER: Primary | ICD-10-CM

## 2018-02-28 DIAGNOSIS — S52.502D CLOSED FRACTURE OF DISTAL ENDS OF LEFT RADIUS AND ULNA WITH ROUTINE HEALING, SUBSEQUENT ENCOUNTER: Primary | ICD-10-CM

## 2018-02-28 PROCEDURE — 99024 POSTOP FOLLOW-UP VISIT: CPT | Performed by: ORTHOPAEDIC SURGERY

## 2018-02-28 NOTE — PROGRESS NOTES
Orthopaedic Surgery - Office Note  Amrik Dukes (47 y o  female)   : 1955   MRN: [id number]  Encounter Date: 2018    Chief Complaint   Patient presents with    Left Wrist - Post-op       Assessment / Plan  S/p ORIF left distal radius fracture    · 1 lb lifting restriction  · Volar wrist brace  · Begin OT hand therapy for finger and wrist ROM  · Anti-inflammatories or Tylenol prn pain  · No Follow-up on file  History of Present Illness  Amrik Dukes is a 61 y o  female who presents to followup ORIF left distal radius fracture on 18  Her pain is well controlled with oral meds  Denies numbness in the hand  Denies wound drainage  No trouble with the splint  Review of Systems  Pertinent items are noted in HPI  All other systems were reviewed and are negative  Physical Exam  /84   Pulse 78   Ht 5' 4" (1 626 m)   Wt 90 7 kg (200 lb)   BMI 34 33 kg/m²   Cons: Appears well  No apparent distress  Psych: Alert  Oriented x3  Mood and affect normal   Eyes: PERRLA, EOMI  Resp: Normal effort  No audible wheezing or stridor  CV: Palpable pulse  No discernable arrhythmia  No LE edema  Lymph:  No palpable cervical, axillary, or inguinal lymphadenopathy  Skin: Warm  No palpable masses  No visible lesions  Neuro: Normal muscle tone  Normal and symmetric DTR's  Left Hand & Wrist Exam  Alignment:  Normal resting hand posture  Inspection:  moderate hand and finger swelling  mild ecchymosis  Incision clean and dry  Palpation:  mild incisional tenderness  ROM:  llimited finger and wrist ROM due to swelling and postoperative stiffness  Strength:  Not tested  Stability:  No objective hand or wrist instability  Tests:  No pertinent positive or negative tests  Neurovascular:  Sensation intact in all digital nerve distributions  Brisk capillary refill in all fingertips  Studies Reviewed  No studies to review    Procedures  No procedures today      Medical, Surgical, Family, and Social History  The patient's medical history, family history, and social history, were reviewed and updated as appropriate  Past Medical History:   Diagnosis Date    Allergic rhinitis     DVT of lower extremity (deep venous thrombosis) ([facility] [address] ) 2014    Fracture     L radius/ulna occurrred 2/9/2018-OR correction today 2/19/2018    GERD (gastroesophageal reflux disease)     H  pylori infection     9/2017-treated    History of acute bronchitis     Pulmonary embolism ([facility] [address] ) 2014    Wears glasses        Past Surgical History:   Procedure Laterality Date    COLONOSCOPY      ESOPHAGOGASTRODUODENOSCOPY      [place] COLONOSCOPY FLX DX W/COLLJ SPEC WHEN PFRMD N/A 9/29/2017    Procedure: EGD with bx  AND COLONOSCOPY;  Surgeon: Una Angulo DO;  [facility]: [facility]; Service: Gastroenterology    [place] OPEN RX DISTAL RADIUS FX, EXTRA-ARTICULAR Left 2/19/2018    Procedure: OPEN REDUCTION W/ INTERNAL FIXATION (ORIF) RADIUS / ULNA (WRIST); Surgeon: Zac Lewis MD;  [facility];  Service: Orthopedics       Family History   Problem Relation Age of Onset    No Known Problems Mother     No Known Problems Father        Social History     Occupational History    Not on file       Social History Main Topics    Smoking status: Never Smoker    Smokeless tobacco: Never Used    Alcohol use Yes      Comment: socially    Drug use: No    Sexual activity: Not on file       Allergies   Allergen Reactions    Sulfa Antibiotics Rash         Current Outpatient Prescriptions:     acetaminophen (TYLENOL) 325 mg tablet, Take 1,000 mg by mouth 2 (two) times a day, Disp: , Rfl:     famotidine (PEPCID) 20 mg tablet, Take 20 mg by mouth daily  , Disp: , Rfl:     FLUTICASONE PROPIONATE NA, into each nostril, Disp: , Rfl:     oxyCODONE (ROXICODONE) 5 mg immediate release tablet, Take 1 tablet (5 mg total) by mouth every 4 (four) hours as needed for moderate pain for up to 10 days Max Daily Amount: 30 mg, Disp: 30 tablet, Rfl: 0    Rivaroxaban (XARELTO PO), Take 20 mg by mouth, Disp: , Rfl:     omeprazole (PriLOSEC) 40 MG capsule, Take 1 capsule by mouth 2 (two) times a day, Disp: , Rfl:     promethazine (PHENERGAN) 12 5 MG tablet, Take 1 tablet (12 5 mg total) by mouth every 6 (six) hours as needed for nausea or vomiting, Disp: 30 tablet, Rfl: 0      Juliane Fournier MD    Scribe Attestation    I,:    am acting as a scribe while in the presence of the attending physician :        I,:    personally performed the services described in this documentation    as scribed in my presence :

## 2018-03-02 ENCOUNTER — OFFICE VISIT (OUTPATIENT)
Dept: OCCUPATIONAL THERAPY | Facility: CLINIC | Age: 63
End: 2018-03-02
Payer: COMMERCIAL

## 2018-03-02 DIAGNOSIS — S52.502D CLOSED FRACTURE OF DISTAL ENDS OF LEFT RADIUS AND ULNA WITH ROUTINE HEALING, SUBSEQUENT ENCOUNTER: ICD-10-CM

## 2018-03-02 DIAGNOSIS — S52.602D CLOSED FRACTURE OF DISTAL ENDS OF LEFT RADIUS AND ULNA WITH ROUTINE HEALING, SUBSEQUENT ENCOUNTER: ICD-10-CM

## 2018-03-02 PROCEDURE — 97140 MANUAL THERAPY 1/> REGIONS: CPT | Performed by: OCCUPATIONAL THERAPIST

## 2018-03-02 PROCEDURE — 97165 OT EVAL LOW COMPLEX 30 MIN: CPT | Performed by: OCCUPATIONAL THERAPIST

## 2018-03-02 PROCEDURE — G8990 OTHER PT/OT CURRENT STATUS: HCPCS | Performed by: OCCUPATIONAL THERAPIST

## 2018-03-02 PROCEDURE — G8991 OTHER PT/OT GOAL STATUS: HCPCS | Performed by: OCCUPATIONAL THERAPIST

## 2018-03-02 NOTE — PROGRESS NOTES
OT Evaluation     Today's date: 3/2/2018  Patient name: Ana Cristina Smith  : 1955  MRN: [id number]  Referring provider: Vickie Moore MD  Dx:   Encounter Diagnosis     ICD-10-CM    1  Closed fracture of distal ends of left radius and ulna with routine healing, subsequent encounter S52 502D Ambulatory referral to Occupational Therapy    S52 130V                   Assessment    Assessment details: L hand dom female referred after a distal radius ORIF on 18  She presents with digital edema affecting ROM  She is unable to form a composite fist at this time, or achieve full extension due to extrinsic tightness  Wrist ROM is very limited compared to the contralateral side, which is expected  Her steristrips are in place  Pain is present with most attempts at motion  She denies numbness and tingling  Understanding of Dx/Px/POC: excellent  Goals  STG: Patient will be compliant with post operative precautions (if applicable) and home exercise program in 2 weeks  STG:  Pain will be reduced by 25% in 4 weeks  STG: Inflammation/edema/joint effusion will be reduced by 25% and patient will be independent with self management techniques in 4 weeks  STG: Range of motion of hand and wrist will be improved by 25% in 4 weeks  STG: Strength will be improved by 25% in 6 weeks  LTG: Performance in ADLs and IADLS will be improved to prior level of function with the affected extremity within 8 weeks  LTG: Performance in work/school activity will be improved to prior level of function with the affected extremity within 8 weeks  LTG: FOTO score increase by 20 points within 8 weeks         Plan  Patient would benefit from: skilled OT  Planned modality interventions: thermotherapy: hydrocollator packs  Planned therapy interventions: manual therapy, joint mobilization, home exercise program, functional ROM exercises, therapeutic exercise, therapeutic activities, stretching and strengthening  Frequency: 2x week  Treatment plan discussed with: patient        Subjective Evaluation    History of Present Illness  Date of onset: 2018  Date of surgery: 2018  Mechanism of injury: surgery  Mechanism of injury: Left distal radius ORIF on 18  Originally fell on ice on 18  Referred for edema management, digit ROM and early mobilization of the wrist    Pain  No pain reported  At worst pain ratin  Quality: tight    Social Support    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  )  Hand dominance: left    Patient Goals  Patient goals for therapy: decreased edema, independence with ADLs/IADLs and return to work          Objective     Neurological Testing     Additional Neurological Details  No reports of numbness or tingling  Active Range of Motion     Left Elbow   Forearm supination: 35 degrees   Forearm pronation: 60 degrees     Left Wrist   Wrist flexion: 20 degrees   Wrist extension: 25 degrees   Radial deviation: 15 degrees   Ulnar deviation: 7 degrees     Right Wrist   Wrist flexion: 60 degrees   Wrist extension: 55 degrees   Radial deviation: 25 degrees   Ulnar deviation: 20 degrees     Left Thumb   Flexion     MP: 25 degrees    DIP: 20 degrees  Palmar Abduction     CMC: 40 degrees  Radial abduction    CMC: 25 degrees  Opposition: Opposition to small finger  Right Thumb   Flexion     MP: 43    DIP: 60  Palmar Abduction    CMC: 75  Radial Abduction    CMC: 55    Additional Active Range of Motion Details  Distance to [facility]:  If=5cm  Lf=5cm  Rf=5cm  Sf=3 5cm    Tests     Left Wrist/Hand   Positive extrinsic extensor tightness, extrinsic flexor tightness and intrinsic muscle tightness       Swelling     Left Wrist/Hand   Thumb     Proximal: 6 5 cm  Index     Proximal: 6 3 cm  Middle     Proximal: 6 5 cm  Ring     Proximal: 6 1 cm  Little     Proximal: 5 5 cm  Circumference wrist: 17 cm    Right Wrist/Hand   Circumference wrist: 15 cm         Assessment: Became light headed during manual therapy (edema massage)  Unable to continue manual treatment  Subsided within minutes after drinking water       Precautions: Distal Radius ORIF 2/19/18    Daily Treatment Diary      Manual   3/2           Digit ROM             Wrist AROM             Edema mgt  10'                                             Exercise Diary              Wrist maze             Keypegs             Marbles opposition roll                                                                                                                                                                                                                                                                  Modalities  3/2           MHP Pre  10'           CP post PRN

## 2018-03-08 ENCOUNTER — APPOINTMENT (OUTPATIENT)
Dept: OCCUPATIONAL THERAPY | Facility: CLINIC | Age: 63
End: 2018-03-08
Payer: COMMERCIAL

## 2018-03-13 ENCOUNTER — OFFICE VISIT (OUTPATIENT)
Dept: OCCUPATIONAL THERAPY | Facility: CLINIC | Age: 63
End: 2018-03-13
Payer: COMMERCIAL

## 2018-03-13 DIAGNOSIS — S52.502D CLOSED FRACTURE OF DISTAL ENDS OF LEFT RADIUS AND ULNA WITH ROUTINE HEALING, SUBSEQUENT ENCOUNTER: Primary | ICD-10-CM

## 2018-03-13 DIAGNOSIS — S52.602D CLOSED FRACTURE OF DISTAL ENDS OF LEFT RADIUS AND ULNA WITH ROUTINE HEALING, SUBSEQUENT ENCOUNTER: Primary | ICD-10-CM

## 2018-03-13 PROCEDURE — 97010 HOT OR COLD PACKS THERAPY: CPT | Performed by: OCCUPATIONAL THERAPIST

## 2018-03-13 PROCEDURE — 97140 MANUAL THERAPY 1/> REGIONS: CPT | Performed by: OCCUPATIONAL THERAPIST

## 2018-03-13 PROCEDURE — 97110 THERAPEUTIC EXERCISES: CPT | Performed by: OCCUPATIONAL THERAPIST

## 2018-03-13 PROCEDURE — 97112 NEUROMUSCULAR REEDUCATION: CPT | Performed by: OCCUPATIONAL THERAPIST

## 2018-03-13 NOTE — PROGRESS NOTES
Daily Note     Today's date: 3/13/2018  Patient name: Faustino Dow  : 1955  MRN: [id number]  Referring provider: Cody Son MD  Dx:   Encounter Diagnosis     ICD-10-CM    1  Closed fracture of distal ends of left radius and ulna with routine healing, subsequent encounter S52 502D     S52 602D                   Subjective: "going back is stiff"      Objective: See treatment diary below      Assessment: Tolerated treatment well  Patient demonstrated fatigue post treatment      Plan: Continue per plan of care         Precautions: Distal Radius ORIF 18    Daily Treatment Diary      Manual   3/2  3/13         Digit PROM    5         Wrist AROM             Edema mgt  10'            MEM    20                             Exercise Diary   3/13           Wrist maze  5x           Keypegs             Marbles opposition roll               wrist AROM  25x            sup/pro  With dowel  10x 10 sec each            translation to grasp and release Sm/med size objects                                                                                                                                                                                                                     Modalities  3/2  3/12         MHP Pre  10'  10         CP post PRN

## 2018-03-15 ENCOUNTER — APPOINTMENT (OUTPATIENT)
Dept: OCCUPATIONAL THERAPY | Facility: CLINIC | Age: 63
End: 2018-03-15
Payer: COMMERCIAL

## 2018-03-19 ENCOUNTER — OFFICE VISIT (OUTPATIENT)
Dept: OCCUPATIONAL THERAPY | Facility: CLINIC | Age: 63
End: 2018-03-19
Payer: COMMERCIAL

## 2018-03-19 DIAGNOSIS — S52.502D CLOSED FRACTURE OF DISTAL ENDS OF LEFT RADIUS AND ULNA WITH ROUTINE HEALING, SUBSEQUENT ENCOUNTER: Primary | ICD-10-CM

## 2018-03-19 DIAGNOSIS — S52.602D CLOSED FRACTURE OF DISTAL ENDS OF LEFT RADIUS AND ULNA WITH ROUTINE HEALING, SUBSEQUENT ENCOUNTER: Primary | ICD-10-CM

## 2018-03-19 PROCEDURE — 97110 THERAPEUTIC EXERCISES: CPT | Performed by: OCCUPATIONAL THERAPIST

## 2018-03-19 PROCEDURE — 97140 MANUAL THERAPY 1/> REGIONS: CPT | Performed by: OCCUPATIONAL THERAPIST

## 2018-03-19 NOTE — PROGRESS NOTES
Daily Note     Today's date: 3/19/2018  Patient name: Lorraine Knight  : 1955  MRN: [id number]  Referring provider: Sina Crandall MD  Dx:   Encounter Diagnosis     ICD-10-CM    1  Closed fracture of distal ends of left radius and ulna with routine healing, subsequent encounter S52 502D     S52 602D                   Subjective: "I have to have a purpose to do something"      Objective: See treatment diary below      Assessment: Very irritable and limited by pain during digit ROM and STM  Unable to perform full digit mobilization as desired  Concerned that digit stiffness is going to become chronic  Extrinsic tightness in both directions very limiting  Plan: Continue per plan of care         Precautions: Distal Radius ORIF 18    Daily Treatment Diary      Manual   3/2  3/13  3/19       Digit PROM    5  10'       Wrist AROM             Edema mgt  10'            MEM    20  5'        IASTM      10'             Exercise Diary   3/13  3/19         Wrist maze  5x  10x         Keypegs             Marbles opposition roll               wrist AROM  25x            sup/pro  With dowel  10x 10 sec each  10x10 red          translation to grasp and release Sm/med size objects            pencil progressive grasp    2x                                                                                                                                                                                                     Modalities  3/2  3/12  3/19       MHP Pre  10'  10  10'       CP post PRN

## 2018-03-22 ENCOUNTER — APPOINTMENT (OUTPATIENT)
Dept: OCCUPATIONAL THERAPY | Facility: CLINIC | Age: 63
End: 2018-03-22
Payer: COMMERCIAL

## 2018-03-26 ENCOUNTER — OFFICE VISIT (OUTPATIENT)
Dept: OCCUPATIONAL THERAPY | Facility: CLINIC | Age: 63
End: 2018-03-26
Payer: COMMERCIAL

## 2018-03-26 DIAGNOSIS — S52.602D CLOSED FRACTURE OF DISTAL ENDS OF LEFT RADIUS AND ULNA WITH ROUTINE HEALING, SUBSEQUENT ENCOUNTER: Primary | ICD-10-CM

## 2018-03-26 DIAGNOSIS — S52.502D CLOSED FRACTURE OF DISTAL ENDS OF LEFT RADIUS AND ULNA WITH ROUTINE HEALING, SUBSEQUENT ENCOUNTER: Primary | ICD-10-CM

## 2018-03-26 PROCEDURE — 97022 WHIRLPOOL THERAPY: CPT | Performed by: OCCUPATIONAL THERAPIST

## 2018-03-26 PROCEDURE — 97110 THERAPEUTIC EXERCISES: CPT | Performed by: OCCUPATIONAL THERAPIST

## 2018-03-26 PROCEDURE — 97140 MANUAL THERAPY 1/> REGIONS: CPT | Performed by: OCCUPATIONAL THERAPIST

## 2018-03-26 NOTE — PROGRESS NOTES
Daily Note     Today's date: 3/26/2018  Patient name: Collette Maine  : 1955  MRN: [id number]  Referring provider: Carissa Alcazar MD  Dx:   Encounter Diagnosis     ICD-10-CM    1  Closed fracture of distal ends of left radius and ulna with routine healing, subsequent encounter S52 502D     S52 602D                   Subjective: "they just don't go"      Objective: See treatment diary below      Assessment: Demonstrating decreased FDS and FDP gliding, affecting fist formation  Educated on isolated tendon gliding to perform at home  Also initiated scar mobilization  Would benefit from KT over scar  She is eager to RTW on light duty and sees MD this week  Plan: Continue per plan of care         Precautions: Distal Radius ORIF 18    Daily Treatment Diary      Manual   3/2  3/13  3/19  3/26     Digit PROM    5  10'  15'     Wrist AROM             Edema mgt  10'            MEM    20  5'        IASTM      10'  10'     Scar sucker    5'          Exercise Diary   3/13  3/19  3/26       Wrist maze  5x  10x  5x       Keypegs      1x       Marbles opposition roll               wrist AROM  25x            sup/pro  With dowel  10x 10 sec each  10x10 red          translation to grasp and release Sm/med size objects            pencil progressive grasp    2x  2x                                                                                                                                                                                                   Modalities  3/2  3/12  3/19  3/26     MHP Pre  10'  10  10'       Fluido        10

## 2018-03-28 ENCOUNTER — OFFICE VISIT (OUTPATIENT)
Dept: OBGYN CLINIC | Facility: OTHER | Age: 63
End: 2018-03-28

## 2018-03-28 ENCOUNTER — APPOINTMENT (OUTPATIENT)
Dept: RADIOLOGY | Facility: OTHER | Age: 63
End: 2018-03-28
Payer: COMMERCIAL

## 2018-03-28 VITALS
HEIGHT: 63 IN | HEART RATE: 93 BPM | BODY MASS INDEX: 35.97 KG/M2 | WEIGHT: 203 LBS | SYSTOLIC BLOOD PRESSURE: 141 MMHG | DIASTOLIC BLOOD PRESSURE: 82 MMHG

## 2018-03-28 DIAGNOSIS — S52.502D CLOSED FRACTURE OF DISTAL ENDS OF LEFT RADIUS AND ULNA WITH ROUTINE HEALING, SUBSEQUENT ENCOUNTER: ICD-10-CM

## 2018-03-28 DIAGNOSIS — S52.602D CLOSED FRACTURE OF DISTAL ENDS OF LEFT RADIUS AND ULNA WITH ROUTINE HEALING, SUBSEQUENT ENCOUNTER: Primary | ICD-10-CM

## 2018-03-28 DIAGNOSIS — S52.502D CLOSED FRACTURE OF DISTAL ENDS OF LEFT RADIUS AND ULNA WITH ROUTINE HEALING, SUBSEQUENT ENCOUNTER: Primary | ICD-10-CM

## 2018-03-28 DIAGNOSIS — S52.602D CLOSED FRACTURE OF DISTAL ENDS OF LEFT RADIUS AND ULNA WITH ROUTINE HEALING, SUBSEQUENT ENCOUNTER: ICD-10-CM

## 2018-03-28 PROCEDURE — 73110 X-RAY EXAM OF WRIST: CPT

## 2018-03-28 PROCEDURE — 99024 POSTOP FOLLOW-UP VISIT: CPT | Performed by: ORTHOPAEDIC SURGERY

## 2018-03-28 NOTE — PROGRESS NOTES
Orthopaedic Surgery - Office Note  Tan Shell (52 y o  female)   : 1955   MRN: [id number]  Encounter Date: 3/28/2018    Chief Complaint   Patient presents with    Left Wrist - Post-op       Assessment / Plan  5 weeks s/p L distal radius ORIF    · Continue outpatient PT to work on finger and wrist ROM  · Discontinue use of wrist splint at this point in time  · A note was given to the patient today to return to work full duty on 18  · The patient was instructed to call in 3 weeks if she is noticing that she is having trouble regaining finger and wrist ROM  Return in about 6 weeks (around 2018) for Recheck  History of Present Illness  Tan Shell is a 61 y o  female who presents 5 weeks s/p L distal radius ORIF  Pt states that she is overall doing well  Pt is going to formal therapy 2x a week and is tolerating all the exercises well  Pt takes tylenol on occasion for discomfort  Pt c/o fatigue in her L wrist at this point in time  Pt denies numbness and tingling  Pt would like to get back to work today on light duty  Review of Systems  Pertinent items are noted in HPI  All other systems were reviewed and are negative  Physical Exam  /82   Pulse 93   Ht 5' 3" (1 6 m)   Wt 92 1 kg (203 lb)   BMI 35 96 kg/m²   Cons: Appears well  No apparent distress  Psych: Alert  Oriented x3  Mood and affect normal   Eyes: PERRLA, EOMI  Resp: Normal effort  No audible wheezing or stridor  CV: Palpable pulse  No discernable arrhythmia  No LE edema  Lymph:  No palpable cervical, axillary, or inguinal lymphadenopathy  Skin: Warm  No palpable masses  No visible lesions  Neuro: Normal muscle tone  Normal and symmetric DTR's  Left Hand & Wrist Exam  Alignment:  Normal resting hand posture  Inspection:  No swelling  Incision healed  Palpation:  No tenderness  ROM:  Normal elbow ROM  Wrist Extension neutral  Wrist Flexion 25  Full extension of all fingers   lacks the ability to make a full composite fist    Strength:  5/5 biceps and triceps  5/5 wrist flexors and wrist extensors  Stability:  No objective hand or wrist instability  Tests:  No pertinent positive or negative tests  Neurovascular:  Sensation intact in Ax/R/M/U nerve distributions  2+ radial pulse  Brisk capillary refill in all fingertips  Studies Reviewed  XR of left wrist - shows routine healing as well as proper hardware alignment    Procedures  No procedures today  Medical, Surgical, Family, and Social History  The patient's medical history, family history, and social history, were reviewed and updated as appropriate  Past Medical History:   Diagnosis Date    Allergic rhinitis     DVT of lower extremity (deep venous thrombosis) ([facility] [address] ) 2014    Fracture     L radius/ulna occurrred 2/9/2018-OR correction today 2/19/2018    GERD (gastroesophageal reflux disease)     H  pylori infection     9/2017-treated    History of acute bronchitis     Pulmonary embolism ([facility] ) 2014    Wears glasses        Past Surgical History:   Procedure Laterality Date    COLONOSCOPY      ESOPHAGOGASTRODUODENOSCOPY      [place] COLONOSCOPY FLX DX W/COLLJ SPEC WHEN PFRMD N/A 9/29/2017    Procedure: EGD with bx  AND COLONOSCOPY;  Surgeon: Annmarie Markham DO;  Location: [facility]; Service: Gastroenterology    [place] OPEN RX DISTAL RADIUS FX, EXTRA-ARTICULAR Left 2/19/2018    Procedure: OPEN REDUCTION W/ INTERNAL FIXATION (ORIF) RADIUS / ULNA (WRIST); Surgeon: Josse Montero MD;  Location: [facility];  Service: Orthopedics       Family History   Problem Relation Age of Onset    No Known Problems Mother     No Known Problems Father        Social History     Occupational History    Not on file       Social History Main Topics    Smoking status: Never Smoker    Smokeless tobacco: Never Used    Alcohol use Yes      Comment: socially    Drug use: No    Sexual activity: Not on file       Allergies   Allergen Reactions    Sulfa Antibiotics Rash Current Outpatient Prescriptions:     acetaminophen (TYLENOL) 325 mg tablet, Take 1,000 mg by mouth 2 (two) times a day, Disp: , Rfl:     FLUTICASONE PROPIONATE NA, into each nostril, Disp: , Rfl:     omeprazole (PriLOSEC) 40 MG capsule, Take 1 capsule by mouth 2 (two) times a day, Disp: , Rfl:     Rivaroxaban (XARELTO PO), Take 20 mg by mouth, Disp: , Rfl:     famotidine (PEPCID) 20 mg tablet, Take 20 mg by mouth daily  , Disp: , Rfl:       Andres Hernandez    Scribe Attestation    I,:   Andres Hernandez am acting as a scribe while in the presence of the attending physician :        I,:   Basil Chen MD personally performed the services described in this documentation    as scribed in my presence :

## 2018-03-28 NOTE — LETTER
March 28, 2018     Patient: Jihan Faustin   YOB: 1955   Date of Visit: 3/28/2018       To Whom it May Concern:    Jihan Faustin is under my professional care  She was seen in my office on 3/28/2018  She may return to work on 4/2/18 without restrictions       If you have any questions or concerns, please don't hesitate to call           Sincerely,          Mehdi Mckeon MD        CC: No Recipients

## 2018-03-29 ENCOUNTER — OFFICE VISIT (OUTPATIENT)
Dept: OCCUPATIONAL THERAPY | Facility: CLINIC | Age: 63
End: 2018-03-29
Payer: COMMERCIAL

## 2018-03-29 DIAGNOSIS — S52.602D CLOSED FRACTURE OF DISTAL ENDS OF LEFT RADIUS AND ULNA WITH ROUTINE HEALING, SUBSEQUENT ENCOUNTER: Primary | ICD-10-CM

## 2018-03-29 DIAGNOSIS — S52.502D CLOSED FRACTURE OF DISTAL ENDS OF LEFT RADIUS AND ULNA WITH ROUTINE HEALING, SUBSEQUENT ENCOUNTER: Primary | ICD-10-CM

## 2018-03-29 PROCEDURE — 97110 THERAPEUTIC EXERCISES: CPT | Performed by: OCCUPATIONAL THERAPIST

## 2018-03-29 PROCEDURE — 97140 MANUAL THERAPY 1/> REGIONS: CPT | Performed by: OCCUPATIONAL THERAPIST

## 2018-03-29 NOTE — PROGRESS NOTES
Daily Note     Today's date: 3/29/2018  Patient name: Sasha Grider  : 1955  MRN: [id number]  Referring provider: Jose Antonio Pacheco MD  Dx:   No diagnosis found  Subjective: "i'm going back to work"      Objective: See treatment diary below      Assessment: Slight improvement in extrinsic tightness, but still severely limited  Provided PROM for HEP  Applied KT over scar  Distal area more adhered than proximal area  She is returning to work full duty with no restrictions on   Ideally, the increase use at work will improve her active motion of wrist and fingers  Plan: Continue per plan of care         Precautions: Distal Radius ORIF 18    Daily Treatment Diary      Manual   3/2  3/13  3/19  3/26  3/29   Digit PROM    5  10'  15'  5'   Wrist PROM          10'   Edema mgt  10'            MEM    20  5'        IASTM      10'  10'     Scar sucker    5'    Extrinsic stretching     10'   Intrinsic stretching     5'                 Exercise Diary   3/13  3/19  3/26  3/29     Wrist maze  5x  10x  5x  5x     Keypegs      1x       Marbles opposition roll               wrist AROM  25x            sup/pro  With dowel  10x 10 sec each  10x10 red    2x10 green       translation to grasp and release Sm/med size objects            pencil progressive grasp    2x  2x  2x      marbles adduction         20x                                                                                                                                                                                   Modalities  3/2  3/12  3/19  3/26  3/29   MHP Pre  10'  10  10'    15'   Fluido        10

## 2018-04-02 ENCOUNTER — OFFICE VISIT (OUTPATIENT)
Dept: OCCUPATIONAL THERAPY | Facility: CLINIC | Age: 63
End: 2018-04-02
Payer: COMMERCIAL

## 2018-04-02 DIAGNOSIS — S52.502D CLOSED FRACTURE OF DISTAL ENDS OF LEFT RADIUS AND ULNA WITH ROUTINE HEALING, SUBSEQUENT ENCOUNTER: Primary | ICD-10-CM

## 2018-04-02 DIAGNOSIS — S52.602D CLOSED FRACTURE OF DISTAL ENDS OF LEFT RADIUS AND ULNA WITH ROUTINE HEALING, SUBSEQUENT ENCOUNTER: Primary | ICD-10-CM

## 2018-04-02 PROCEDURE — 97140 MANUAL THERAPY 1/> REGIONS: CPT | Performed by: OCCUPATIONAL THERAPIST

## 2018-04-02 PROCEDURE — 97110 THERAPEUTIC EXERCISES: CPT | Performed by: OCCUPATIONAL THERAPIST

## 2018-04-02 NOTE — PROGRESS NOTES
Daily Note     Today's date: 2018  Patient name: Eric Robles  : 1955  MRN: [id number]  Referring provider: Tom Riley MD  Dx:   Encounter Diagnosis     ICD-10-CM    1  Closed fracture of distal ends of left radius and ulna with routine healing, subsequent encounter S52 502D     S52 602D                   Subjective: "i'm exhausted"      Objective: See treatment diary below      Assessment: Began fabrication of a dynamic WF/WE orthosis to progress motion  Plan: Continue per plan of care         Precautions: Distal Radius ORIF 18    Daily Treatment Diary      Manual   4/2  3/13  3/19  3/26  3/29   Digit PROM  10'  5  10'  15'  5'   Wrist PROM  10'        10'   Edema mgt              MEM    20  5'        IASTM      10'  10'     Scar sucker    5'    Extrinsic stretching     10'   Intrinsic stretching 5'    5'                 Exercise Diary   3/13  3/19  3/26  3/29  4/2   Wrist maze  5x  10x  5x  5x     Keypegs      1x       Marbles opposition roll               wrist AROM  25x            sup/pro  With dowel  10x 10 sec each  10x10 red    2x10 green   2x10 blue    translation to grasp and release Sm/med size objects            pencil progressive grasp    2x  2x  2x  2x    marbles adduction         20x                                                                                                                                                                                   Modalities 4/2  3/12  3/19  3/26  3/29   MHP Pre  10'  10  10'    15'   Fluido        10

## 2018-04-05 ENCOUNTER — APPOINTMENT (OUTPATIENT)
Dept: OCCUPATIONAL THERAPY | Facility: CLINIC | Age: 63
End: 2018-04-05
Payer: COMMERCIAL

## 2018-04-09 ENCOUNTER — APPOINTMENT (OUTPATIENT)
Dept: OCCUPATIONAL THERAPY | Facility: CLINIC | Age: 63
End: 2018-04-09
Payer: COMMERCIAL

## 2018-04-12 ENCOUNTER — OFFICE VISIT (OUTPATIENT)
Dept: OCCUPATIONAL THERAPY | Facility: CLINIC | Age: 63
End: 2018-04-12
Payer: COMMERCIAL

## 2018-04-12 DIAGNOSIS — S52.502D CLOSED FRACTURE OF DISTAL ENDS OF LEFT RADIUS AND ULNA WITH ROUTINE HEALING, SUBSEQUENT ENCOUNTER: Primary | ICD-10-CM

## 2018-04-12 DIAGNOSIS — S52.602D CLOSED FRACTURE OF DISTAL ENDS OF LEFT RADIUS AND ULNA WITH ROUTINE HEALING, SUBSEQUENT ENCOUNTER: Primary | ICD-10-CM

## 2018-04-12 PROCEDURE — 97010 HOT OR COLD PACKS THERAPY: CPT | Performed by: OCCUPATIONAL THERAPIST

## 2018-04-12 PROCEDURE — 97140 MANUAL THERAPY 1/> REGIONS: CPT | Performed by: OCCUPATIONAL THERAPIST

## 2018-04-12 NOTE — PROGRESS NOTES
Daily Note     Today's date: 2018  Patient name: Jorden Lawson  : 1955  MRN: [id number]  Referring provider: Nunu Kapoor MD  Dx:   Encounter Diagnosis     ICD-10-CM    1  Closed fracture of distal ends of left radius and ulna with routine healing, subsequent encounter S52 502D     S52 602D                   Subjective: "it gets tired"      Objective: See treatment diary below      Assessment: Still very limited flex/ext, will require splinting assist        Plan: Continue per plan of care         Precautions: Distal Radius ORIF 18    Daily Treatment Diary      Manual   4/2  4/12  3/19  3/26  3/29   Digit PROM  10'    10'  15'  5'   Wrist PROM  10'  25      10'   Edema mgt              MEM      5'        IASTM      10'  10'     Scar sucker    5'    Extrinsic stretching     10'   Intrinsic stretching 5'    5'                 Exercise Diary   4/12  3/19  3/26  3/29  4/2   Wrist maze 10  10x  5x  5x     Keypegs  2    1x       Marbles opposition roll               wrist AROM             sup/pro  With dowel 10x10 blue  10x10 red    2x10 green   2x10 blue    translation to grasp and release             pencil progressive grasp    2x  2x  2x  2x    marbles adduction         20x      wall slide  x5                                                                                                                                                                           Modalities 4/2 4/12  3/19  3/26  3/29   MHP Pre  10'  10  10'    15'   Fluido        10

## 2018-04-16 ENCOUNTER — OFFICE VISIT (OUTPATIENT)
Dept: OCCUPATIONAL THERAPY | Facility: CLINIC | Age: 63
End: 2018-04-16
Payer: COMMERCIAL

## 2018-04-16 DIAGNOSIS — S52.602D CLOSED FRACTURE OF DISTAL ENDS OF LEFT RADIUS AND ULNA WITH ROUTINE HEALING, SUBSEQUENT ENCOUNTER: Primary | ICD-10-CM

## 2018-04-16 DIAGNOSIS — S52.502D CLOSED FRACTURE OF DISTAL ENDS OF LEFT RADIUS AND ULNA WITH ROUTINE HEALING, SUBSEQUENT ENCOUNTER: Primary | ICD-10-CM

## 2018-04-16 PROCEDURE — 97010 HOT OR COLD PACKS THERAPY: CPT | Performed by: OCCUPATIONAL THERAPIST

## 2018-04-16 PROCEDURE — 97110 THERAPEUTIC EXERCISES: CPT | Performed by: OCCUPATIONAL THERAPIST

## 2018-04-16 PROCEDURE — 97140 MANUAL THERAPY 1/> REGIONS: CPT | Performed by: OCCUPATIONAL THERAPIST

## 2018-04-16 NOTE — PROGRESS NOTES
Daily Note     Today's date: 2018  Patient name: Siddharth German  : 1955  MRN: [id number]  Referring provider: Keely Yanez MD  Dx:   No diagnosis found  Subjective: "it's coming along"      Objective: See treatment diary below      Assessment: Has static progressive splint now, 30min 2-3x/day  Grossly improving AROM  Plan: Continue per plan of care         Precautions: Distal Radius ORIF 18    Daily Treatment Diary      Manual   4/2  4/12  3/19  3/26  3/29   Digit PROM  10'    10'  15'  5'   Wrist PROM  10'  25      10'   Edema mgt              MEM      5'        IASTM      10'  10'     Scar sucker    5'    Extrinsic stretching     10'   Intrinsic stretching 5'    5'                 Exercise Diary   4/12 4/16  3/26  3/29  4/2   Wrist maze 10    5x  5x     Keypegs  2    1x       Marbles opposition roll               wrist AROM             sup/pro  With dowel 10x10 blue Hammer    2x10 green   2x10 blue    translation to grasp and release             pencil progressive grasp   2x  2x  2x  2x    marbles adduction         20x      wall slide  x5            gross grasp   GPW 3x10          wrist flex/ext/sup/pro   RPW 3x10 each                                                                                                                                             Modalities 4/2 4/12 4/16  3/26  3/29   MHP Pre  10'  10 15    15'   Fluido        10

## 2018-04-19 ENCOUNTER — EVALUATION (OUTPATIENT)
Dept: OCCUPATIONAL THERAPY | Facility: CLINIC | Age: 63
End: 2018-04-19
Payer: COMMERCIAL

## 2018-04-19 DIAGNOSIS — S52.502D CLOSED FRACTURE OF DISTAL ENDS OF LEFT RADIUS AND ULNA WITH ROUTINE HEALING, SUBSEQUENT ENCOUNTER: Primary | ICD-10-CM

## 2018-04-19 DIAGNOSIS — S52.602D CLOSED FRACTURE OF DISTAL ENDS OF LEFT RADIUS AND ULNA WITH ROUTINE HEALING, SUBSEQUENT ENCOUNTER: Primary | ICD-10-CM

## 2018-04-19 PROCEDURE — 97018 PARAFFIN BATH THERAPY: CPT | Performed by: OCCUPATIONAL THERAPIST

## 2018-04-19 PROCEDURE — 97140 MANUAL THERAPY 1/> REGIONS: CPT | Performed by: OCCUPATIONAL THERAPIST

## 2018-04-19 PROCEDURE — G8990 OTHER PT/OT CURRENT STATUS: HCPCS | Performed by: OCCUPATIONAL THERAPIST

## 2018-04-19 PROCEDURE — G8991 OTHER PT/OT GOAL STATUS: HCPCS | Performed by: OCCUPATIONAL THERAPIST

## 2018-04-19 NOTE — PROGRESS NOTES
Daily Note     Today's date: 2018  Patient name: Bhumi Angel  : 1955  MRN: [id number]  Referring provider: Casie Jade MD  Dx:   Encounter Diagnosis     ICD-10-CM    1  Closed fracture of distal ends of left radius and ulna with routine healing, subsequent encounter S52 502D OT Plan of Care Cert/Re-cert    A78 [id number]                   Subjective: "it's coming along"      Objective: See treatment diary below      Assessment:  See RE  Plan: Continue per plan of care         Precautions: Distal Radius ORIF 18    Daily Treatment Diary      Manual   4/2  4/12  4/19  3/26  3/29   Digit PROM  10'   15'  15'  5'   Wrist PROM  10'  25  15    10'   Edema mgt              MEM      5'        IASTM      10'  10'     Scar sucker    5'    Extrinsic stretching   10  10'   Intrinsic stretching 5'  10  5'                 Exercise Diary   4/12 4/16  3/26  3/29  4/2   Wrist maze 10    5x  5x     Keypegs  2    1x       Marbles opposition roll               wrist AROM             sup/pro  With dowel 10x10 blue Hammer    2x10 green   2x10 blue    translation to grasp and release             pencil progressive grasp   2x  2x  2x  2x    marbles adduction         20x      wall slide  x5            gross grasp   GPW 3x10          wrist flex/ext/sup/pro   RPW 3x10 each                                                                                                                                             Modalities 4/2 4/12 4/16  4/19  3/29   MHP Pre  10'  10 15    15'   Fluido        10      Parafin       10

## 2018-04-19 NOTE — PROGRESS NOTES
OT RE-EVALUATION    Today's date: 2018  Patient name: Ton Barros  : 1955  MRN: [id number]  Referring provider: Rosa Elena Hutchison MD  Dx:   No diagnosis found  Assessment    Assessment details: 18: Yeny Pedersen presents with continued weakness, decreased AROM, but improved pain and motion  Recommend continued tx to reach goals 2x/week for 6 weeks  Understanding of Dx/Px/POC: excellent  Goals  STG: Patient will be compliant with post operative precautions (if applicable) and home exercise program in 2 weeks  MET  STG:  Pain will be reduced by 25% in 4 weeks  MET  STG: Inflammation/edema/joint effusion will be reduced by 25% and patient will be independent with self management techniques in 4 weeks  MET  STG: Range of motion of hand and wrist will be improved by 25% in 4 weeks  STG: Strength will be improved by 25% in 6 weeks  LTG: Performance in ADLs and IADLS will be improved to prior level of function with the affected extremity within 8 weeks  PARTIALLY MET  LTG: Performance in work/school activity will be improved to prior level of function with the affected extremity within 8 weeks  PARTIALLY MET  LTG: FOTO score increase by 20 points within 8 weeks  PARTIALLY MET      Plan  Patient would benefit from: skilled OT  Planned modality interventions: thermotherapy: hydrocollator packs  Planned therapy interventions: manual therapy, joint mobilization, home exercise program, functional ROM exercises, therapeutic exercise, therapeutic activities, stretching and strengthening  Frequency: 2x week  Treatment plan discussed with: patient        Subjective Evaluation    History of Present Illness  Date of onset: 2018  Date of surgery: 2018  Mechanism of injury: surgery  Mechanism of injury: Left distal radius ORIF on 18  Originally fell on ice on 18   Referred for edema management, digit ROM and early mobilization of the wrist    Pain  No pain reported  At worst pain rating: 1  Quality: tight    Social Support    Employment status: working (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  )  Hand dominance: left    Patient Goals  Patient goals for therapy: decreased edema, independence with ADLs/IADLs and return to work          Objective     Neurological Testing     Additional Neurological Details  No reports of numbness or tingling  Active Range of Motion     Left Elbow   Normal active range of motion  Forearm supination: 72 degrees   Forearm pronation: 50 degrees     Left Wrist   Wrist flexion: 30 degrees   Wrist extension: 40 degrees   Radial deviation: 15 degrees   Ulnar deviation: 20 degrees     Left Thumb   Flexion     MP: 25 degrees    DIP: 20 degrees  Palmar Abduction     CMC: 40 degrees  Radial abduction    CMC: 25 degrees  Opposition: Opposition to small finger  Right Thumb   Flexion     MP: 43    DIP: 60  Palmar Abduction    CMC: 75  Radial Abduction    CMC: 55    Additional Active Range of Motion Details  Distance to [facility]:  If=3 5cm  Lf=3 5cm  Rf 3cm  Sf=2 5cm    Strength/Myotome Testing     Left Wrist/Hand   Wrist extension: 5     (2nd hand position)     Trial 1: 15    Right Wrist/Hand      (2nd hand position)     Trial 1: 40    Tests     Left Wrist/Hand   Positive extrinsic extensor tightness, extrinsic flexor tightness and intrinsic muscle tightness       Swelling     Left Wrist/Hand   Thumb     Proximal: 6 5 cm  Index     Proximal: 6 3 cm  Middle     Proximal: 6 5 cm  Ring     Proximal: 6 1 cm  Little     Proximal: 5 5 cm  Circumference wrist: 17 cm    Right Wrist/Hand   Circumference wrist: 15 cm

## 2018-04-23 ENCOUNTER — APPOINTMENT (OUTPATIENT)
Dept: OCCUPATIONAL THERAPY | Facility: CLINIC | Age: 63
End: 2018-04-23
Payer: COMMERCIAL

## 2018-04-23 NOTE — PROGRESS NOTES
Daily Note     Today's date: 2018  Patient name: Nathanael Shabazz  : 1955  MRN: [id number]  Referring provider: Aj aDo MD  Dx:   No diagnosis found  Subjective: "it's coming along"      Objective: See treatment diary below      Assessment:  See RE  Plan: Continue per plan of care         Precautions: Distal Radius ORIF 18    Daily Treatment Diary      Manual   4/2  4/12  4/19  3/26  3/29   Digit PROM  10'   15'  15'  5'   Wrist PROM  10'  25  15    10'   Edema mgt              MEM      5'        IASTM      10'  10'     Scar sucker    5'    Extrinsic stretching   10  10'   Intrinsic stretching 5'  10  5'                 Exercise Diary   4/12 4/16  3/26  3/29  4/2   Wrist maze 10    5x  5x     Keypegs  2    1x       Marbles opposition roll               wrist AROM             sup/pro  With dowel 10x10 blue Hammer    2x10 green   2x10 blue    translation to grasp and release             pencil progressive grasp   2x  2x  2x  2x    marbles adduction         20x      wall slide  x5            gross grasp   GPW 3x10          wrist flex/ext/sup/pro   RPW 3x10 each                                                                                                                                             Modalities 4/2 4/12 4/16  4/19  3/29   MHP Pre  10'  10 15    15'   Fluido        10      Parafin       10

## 2018-04-26 ENCOUNTER — OFFICE VISIT (OUTPATIENT)
Dept: OCCUPATIONAL THERAPY | Facility: CLINIC | Age: 63
End: 2018-04-26
Payer: COMMERCIAL

## 2018-04-26 DIAGNOSIS — S52.602D CLOSED FRACTURE OF DISTAL ENDS OF LEFT RADIUS AND ULNA WITH ROUTINE HEALING, SUBSEQUENT ENCOUNTER: Primary | ICD-10-CM

## 2018-04-26 DIAGNOSIS — S52.502D CLOSED FRACTURE OF DISTAL ENDS OF LEFT RADIUS AND ULNA WITH ROUTINE HEALING, SUBSEQUENT ENCOUNTER: Primary | ICD-10-CM

## 2018-04-26 PROCEDURE — 97140 MANUAL THERAPY 1/> REGIONS: CPT | Performed by: OCCUPATIONAL THERAPIST

## 2018-04-26 PROCEDURE — 97010 HOT OR COLD PACKS THERAPY: CPT | Performed by: OCCUPATIONAL THERAPIST

## 2018-04-26 PROCEDURE — 97110 THERAPEUTIC EXERCISES: CPT | Performed by: OCCUPATIONAL THERAPIST

## 2018-04-26 NOTE — PROGRESS NOTES
Daily Note     Today's date: 2018  Patient name: Lc Morrison  : 1955  MRN: [id number]  Referring provider: Kirk Ko MD  Dx:   Encounter Diagnosis     ICD-10-CM    1  Closed fracture of distal ends of left radius and ulna with routine healing, subsequent encounter S52 502D     S52 361H                   Subjective: "I get the voicemail"      Objective: See treatment diary below      Assessment: Progressing A/PROM grossly  Wishes to do 1 week off from therapy to see how things are progressing  Plan: Continue per plan of care         Precautions: Distal Radius ORIF 18    Daily Treatment Diary      Manual   4/2  4/12  4/19 4/26  3/29   Digit PROM  10'   15'  15'  5'   Wrist PROM  10'  25  15  15  10'   Edema mgt              MEM      5'        IASTM      10'      Scar sucker        Extrinsic stretching   10  10'   Intrinsic stretching 5'  10  5'                 Exercise Diary   4/12 4/16 4/26  3/29  4/2   Wrist maze 10      5x     Keypegs  2          Marbles opposition roll               wrist AROM             sup/pro  With dowel 10x10 blue Hammer    2x10 green   2x10 blue    translation to grasp and release             pencil progressive grasp   2x   2x  2x    marbles adduction         20x      wall slide  x5            gross grasp   GPW 3x10  BPW 3x10        wrist flex/ext/sup/pro   RPW 3x10 each RPB 3x10        Pinch/grasp     G/3rd                                                                                                                             Modalities 4/2 4/12 4/16  4/19  3/29   MHP Pre  10'  10 15    15'   Fluido        10      Parafin       10

## 2018-04-27 ENCOUNTER — TELEPHONE (OUTPATIENT)
Dept: FAMILY MEDICINE CLINIC | Facility: CLINIC | Age: 63
End: 2018-04-27

## 2018-04-27 NOTE — TELEPHONE ENCOUNTER
Voice message requesting refill of Xarelto  Please send 90 day supply to [facility]  Patient called again today, please order

## 2018-04-30 ENCOUNTER — APPOINTMENT (OUTPATIENT)
Dept: OCCUPATIONAL THERAPY | Facility: CLINIC | Age: 63
End: 2018-04-30
Payer: COMMERCIAL

## 2018-04-30 DIAGNOSIS — M71.20 SYNOVIAL CYST OF POPLITEAL SPACE, UNSPECIFIED LATERALITY: Primary | ICD-10-CM

## 2018-04-30 DIAGNOSIS — I26.99 OTHER PULMONARY EMBOLISM WITHOUT ACUTE COR PULMONALE, UNSPECIFIED CHRONICITY (HCC): ICD-10-CM

## 2018-05-09 ENCOUNTER — OFFICE VISIT (OUTPATIENT)
Dept: OBGYN CLINIC | Facility: OTHER | Age: 63
End: 2018-05-09

## 2018-05-09 ENCOUNTER — APPOINTMENT (OUTPATIENT)
Dept: RADIOLOGY | Facility: OTHER | Age: 63
End: 2018-05-09
Payer: COMMERCIAL

## 2018-05-09 VITALS
BODY MASS INDEX: 35.44 KG/M2 | HEIGHT: 63 IN | SYSTOLIC BLOOD PRESSURE: 146 MMHG | HEART RATE: 73 BPM | WEIGHT: 200 LBS | DIASTOLIC BLOOD PRESSURE: 85 MMHG

## 2018-05-09 DIAGNOSIS — S52.502D CLOSED FRACTURE OF DISTAL ENDS OF LEFT RADIUS AND ULNA WITH ROUTINE HEALING, SUBSEQUENT ENCOUNTER: Primary | ICD-10-CM

## 2018-05-09 DIAGNOSIS — S52.602D CLOSED FRACTURE OF DISTAL ENDS OF LEFT RADIUS AND ULNA WITH ROUTINE HEALING, SUBSEQUENT ENCOUNTER: ICD-10-CM

## 2018-05-09 DIAGNOSIS — S52.502D CLOSED FRACTURE OF DISTAL ENDS OF LEFT RADIUS AND ULNA WITH ROUTINE HEALING, SUBSEQUENT ENCOUNTER: ICD-10-CM

## 2018-05-09 DIAGNOSIS — S52.602D CLOSED FRACTURE OF DISTAL ENDS OF LEFT RADIUS AND ULNA WITH ROUTINE HEALING, SUBSEQUENT ENCOUNTER: Primary | ICD-10-CM

## 2018-05-09 DIAGNOSIS — M71.20 SYNOVIAL CYST OF POPLITEAL SPACE, UNSPECIFIED LATERALITY: ICD-10-CM

## 2018-05-09 PROCEDURE — 73110 X-RAY EXAM OF WRIST: CPT

## 2018-05-09 PROCEDURE — 99024 POSTOP FOLLOW-UP VISIT: CPT | Performed by: ORTHOPAEDIC SURGERY

## 2018-05-09 NOTE — PROGRESS NOTES
Orthopaedic Surgery - Office Note  June Vega (49 y o  female)   : 1955   MRN: [id number]  Encounter Date: 2018    Chief Complaint   Patient presents with    Left Wrist - Follow-up       Assessment / Plan  s/p ORIF left distal radius fracture with appropriate healing, postoperative stiffness improving with PT    · Activity as tolerated  · WBAT  · Continue outpatient PT and transition to HEP  · Anti-inflammatories or Tylenol prn pain  · Return in about 6 weeks (around 2018)  History of Present Illness  June Vega is a 61 y o  female who presents s/p ORIF left distal radius fracture on 18  Her pain is minimal   She did have some postoperative finger stiffness which is improving with PT  Denies numbness  She is back to work full duty  She is interested in transitioning to HEP at this time  Review of Systems  Pertinent items are noted in HPI  All other systems were reviewed and are negative  Physical Exam  /85   Pulse 73   Ht 5' 3" (1 6 m)   Wt 90 7 kg (200 lb)   BMI 35 43 kg/m²   Cons: Appears well  No apparent distress  Psych: Alert  Oriented x3  Mood and affect normal   Eyes: PERRLA, EOMI  Resp: Normal effort  No audible wheezing or stridor  CV: Palpable pulse  No discernable arrhythmia  No LE edema  Lymph:  No palpable cervical, axillary, or inguinal lymphadenopathy  Skin: Warm  No palpable masses  No visible lesions  Neuro: Normal muscle tone  Normal and symmetric DTR's  Left Hand & Wrist Exam  Alignment:  Normal resting hand posture  Inspection:  No swelling  Incision healed  Palpation:  minimal wrist tenderness  ROM:  Wrist Extension 60  Wrist Flexion 40  Pronation 70  Supination 60  Strength:  5/5  and pinch  Stability:  No objective hand or wrist instability  Tests:  No pertinent positive or negative tests  Neurovascular:  Sensation intact in all digital nerve distributions  Brisk capillary refill in all fingertips        Studies Reviewed  I have personally reviewed pertinent films in PACS  XR of left wrist - distal radius fracture healing with excellent alignment and intact hardware    Procedures  No procedures today  Medical, Surgical, Family, and Social History  The patient's medical history, family history, and social history, were reviewed and updated as appropriate  Past Medical History:   Diagnosis Date    Allergic rhinitis     DVT of lower extremity (deep venous thrombosis) ([email or web address] 2014    Fracture     L radius/ulna occurrred 2/9/2018-OR correction today 2/19/2018    GERD (gastroesophageal reflux disease)     H  pylori infection     9/2017-treated    History of acute bronchitis     Pulmonary embolism ([email or web address] ) 2014    Wears glasses        Past Surgical History:   Procedure Laterality Date    COLONOSCOPY      ESOPHAGOGASTRODUODENOSCOPY      MN COLONOSCOPY FLX DX W/COLLJ SPEC WHEN PFRMD N/A 9/29/2017    Procedure: EGD with bx  AND COLONOSCOPY;  Surgeon: Angle Thompson DO;  Location: [facility]; Service: Gastroenterology    MN OPEN RX DISTAL RADIUS FX, EXTRA-ARTICULAR Left 2/19/2018    Procedure: OPEN REDUCTION W/ INTERNAL FIXATION (ORIF) RADIUS / ULNA (WRIST); Surgeon: Sandra Draper MD;  Location: [facility];  Service: Orthopedics       Family History   Problem Relation Age of Onset    No Known Problems Mother     No Known Problems Father        Social History     Occupational History    Not on file       Social History Main Topics    Smoking status: Never Smoker    Smokeless tobacco: Never Used    Alcohol use Yes      Comment: socially    Drug use: No    Sexual activity: Not on file       Allergies   Allergen Reactions    Sulfa Antibiotics Rash         Current Outpatient Prescriptions:     acetaminophen (TYLENOL) 325 mg tablet, Take 1,000 mg by mouth 2 (two) times a day, Disp: , Rfl:     famotidine (PEPCID) 20 mg tablet, Take 20 mg by mouth daily  , Disp: , Rfl:     FLUTICASONE PROPIONATE NA, into each nostril, Disp: , Rfl:     omeprazole (PriLOSEC) 40 MG capsule, Take 1 capsule by mouth 2 (two) times a day, Disp: , Rfl:     rivaroxaban (XARELTO) 20 mg tablet, Take 1 tablet (20 mg total) by mouth daily with breakfast, Disp: 90 tablet, Rfl: 0      Swapnil Cooper MD    Scribe Attestation    I,:    am acting as a scribe while in the presence of the attending physician :        I,:    personally performed the services described in this documentation    as scribed in my presence :

## 2018-05-15 NOTE — PROGRESS NOTES
Did not return to therapy after this visit  See re-bruce from 4/19/18 for last progress note  It is unknown what her improvement was after this date

## 2018-07-12 ENCOUNTER — TRANSCRIBE ORDERS (OUTPATIENT)
Dept: LAB | Facility: CLINIC | Age: 63
End: 2018-07-12

## 2018-07-12 ENCOUNTER — APPOINTMENT (OUTPATIENT)
Dept: LAB | Facility: CLINIC | Age: 63
End: 2018-07-12

## 2018-07-12 DIAGNOSIS — Z00.8 HEALTH EXAMINATION IN POPULATION SURVEY: ICD-10-CM

## 2018-07-12 DIAGNOSIS — Z00.8 HEALTH EXAMINATION IN POPULATION SURVEY: Primary | ICD-10-CM

## 2018-07-12 LAB
CHOLEST SERPL-MCNC: 217 MG/DL (ref 50–200)
EST. AVERAGE GLUCOSE BLD GHB EST-MCNC: 114 MG/DL
HBA1C MFR BLD: 5.6 % (ref 4.2–6.3)
HDLC SERPL-MCNC: 67 MG/DL (ref 40–60)
LDLC SERPL CALC-MCNC: 131 MG/DL (ref 0–100)
NONHDLC SERPL-MCNC: 150 MG/DL
TRIGL SERPL-MCNC: 95 MG/DL

## 2018-07-12 PROCEDURE — 83036 HEMOGLOBIN GLYCOSYLATED A1C: CPT

## 2018-07-12 PROCEDURE — 80061 LIPID PANEL: CPT

## 2018-07-12 PROCEDURE — 36415 COLL VENOUS BLD VENIPUNCTURE: CPT

## 2018-08-03 DIAGNOSIS — I26.99 OTHER PULMONARY EMBOLISM WITHOUT ACUTE COR PULMONALE, UNSPECIFIED CHRONICITY (HCC): ICD-10-CM

## 2018-08-07 ENCOUNTER — TELEPHONE (OUTPATIENT)
Dept: FAMILY MEDICINE CLINIC | Facility: CLINIC | Age: 63
End: 2018-08-07

## 2018-08-07 DIAGNOSIS — I26.99 OTHER PULMONARY EMBOLISM WITHOUT ACUTE COR PULMONALE, UNSPECIFIED CHRONICITY (HCC): ICD-10-CM

## 2018-08-07 NOTE — TELEPHONE ENCOUNTER
I will send 90 day supply but she will need to schedule an appt to establish with the resident who took over for Dr Crystal Lorenzana panel

## 2018-08-07 NOTE — TELEPHONE ENCOUNTER
Patient previously seen by Dr Randy Muse, there is no current office scheduled  It appears that she has not been seen at all this year  Are we able to order 90 day supply of Xarelto?

## 2018-10-05 ENCOUNTER — TELEPHONE (OUTPATIENT)
Dept: FAMILY MEDICINE CLINIC | Facility: CLINIC | Age: 63
End: 2018-10-05

## 2018-11-08 ENCOUNTER — TELEPHONE (OUTPATIENT)
Dept: FAMILY MEDICINE CLINIC | Facility: CLINIC | Age: 63
End: 2018-11-08

## 2018-11-08 DIAGNOSIS — I26.99 OTHER PULMONARY EMBOLISM WITHOUT ACUTE COR PULMONALE, UNSPECIFIED CHRONICITY (HCC): ICD-10-CM

## 2018-11-08 NOTE — TELEPHONE ENCOUNTER
Pt is calling requesting a refill for her Xarelto 20mg, pt has an appt scheduled for 11/27 with  Justina Murdock MD  Pharmacy confirmed

## 2018-11-27 ENCOUNTER — OFFICE VISIT (OUTPATIENT)
Dept: FAMILY MEDICINE CLINIC | Facility: CLINIC | Age: 63
End: 2018-11-27
Payer: COMMERCIAL

## 2018-11-27 VITALS
DIASTOLIC BLOOD PRESSURE: 70 MMHG | RESPIRATION RATE: 16 BRPM | SYSTOLIC BLOOD PRESSURE: 126 MMHG | HEIGHT: 63 IN | HEART RATE: 78 BPM | WEIGHT: 208.4 LBS | TEMPERATURE: 98.8 F | BODY MASS INDEX: 36.93 KG/M2

## 2018-11-27 DIAGNOSIS — K21.9 GASTROESOPHAGEAL REFLUX DISEASE WITHOUT ESOPHAGITIS: ICD-10-CM

## 2018-11-27 DIAGNOSIS — Z00.00 HEALTHCARE MAINTENANCE: ICD-10-CM

## 2018-11-27 DIAGNOSIS — E66.9 OBESITY (BMI 30-39.9): Primary | ICD-10-CM

## 2018-11-27 DIAGNOSIS — D68.59 HYPERCOAGULABLE STATE (HCC): ICD-10-CM

## 2018-11-27 PROCEDURE — 99396 PREV VISIT EST AGE 40-64: CPT | Performed by: FAMILY MEDICINE

## 2018-11-27 NOTE — PROGRESS NOTES
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  History of PE  On rivaroxaban               Subjective:      Patient ID: Emily Oneill is a 61 y o  female  24-year-old female with history of PE, GERD here for annual physical   He does not have any complaints today  She denies any chest pain, shortness of breath, dizziness, diarrhea, constipation  She had a colonoscopy and EGD performed in 2017  She had a mammogram in 2017  She denies any alcohol, cigarettes, illicit drugs  The following portions of the patient's history were reviewed and updated as appropriate: allergies, current medications, past family history, past medical history, past social history, past surgical history and problem list     Review of Systems   Constitutional: Negative  HENT: Negative  Respiratory: Negative  Cardiovascular: Negative  Gastrointestinal: Negative  Genitourinary: Negative  Musculoskeletal: Negative  Neurological: Negative  Hematological: Negative  Objective:    Vitals:    11/27/18 1536   BP: 126/70   Pulse: 78   Resp: 16   Temp: 98 8 °F (37 1 °C)        Physical Exam   Constitutional: She is oriented to person, place, and time  She appears well-developed and well-nourished  No distress  HENT:   Head: Normocephalic  Right Ear: External ear normal    Left Ear: External ear normal    Mouth/Throat: Oropharynx is clear and moist  No oropharyngeal exudate     Eyes: Pupils are equal, round, and reactive to light  Conjunctivae are normal    Neck: Normal range of motion  Cardiovascular: Normal rate, regular rhythm, normal heart sounds and intact distal pulses  Exam reveals no gallop and no friction rub  No murmur heard  Pulmonary/Chest: Effort normal and breath sounds normal  No respiratory distress  She has no wheezes  She has no rales  She exhibits no tenderness  Abdominal: Soft  She exhibits no distension and no mass  There is no tenderness  There is no rebound and no guarding  Musculoskeletal: Normal range of motion  She exhibits no edema, tenderness or deformity  Lymphadenopathy:     She has no cervical adenopathy  Neurological: She is alert and oriented to person, place, and time  Skin: Skin is warm and dry  No rash noted  She is not diaphoretic  No pallor  Psychiatric: She has a normal mood and affect  Vitals reviewed

## 2019-02-11 ENCOUNTER — TELEPHONE (OUTPATIENT)
Dept: FAMILY MEDICINE CLINIC | Facility: CLINIC | Age: 64
End: 2019-02-11

## 2019-02-11 DIAGNOSIS — I26.99 OTHER PULMONARY EMBOLISM WITHOUT ACUTE COR PULMONALE, UNSPECIFIED CHRONICITY (HCC): ICD-10-CM

## 2019-02-11 NOTE — TELEPHONE ENCOUNTER
Medication refilled for Dr Sarah Castaneda while he is out of the office      Please schedule patient for a well woman exam per his last note in Nov   Thank you

## 2019-02-11 NOTE — TELEPHONE ENCOUNTER
Voicemail:    Patient requesting refill Xarelto 20mg #90    Ramesh Olvera  Please advise  Thank you

## 2019-02-13 NOTE — TELEPHONE ENCOUNTER
Called and spoke with patient she stated she leaving for vacation this week and will be away for the next two weeks and stated she will call to set up a well women exam appointment  when she returns  Thanks

## 2019-05-20 ENCOUNTER — TELEPHONE (OUTPATIENT)
Dept: FAMILY MEDICINE CLINIC | Facility: CLINIC | Age: 64
End: 2019-05-20

## 2019-05-20 DIAGNOSIS — I26.99 OTHER PULMONARY EMBOLISM WITHOUT ACUTE COR PULMONALE, UNSPECIFIED CHRONICITY (HCC): ICD-10-CM

## 2019-06-28 ENCOUNTER — HOSPITAL ENCOUNTER (EMERGENCY)
Facility: HOSPITAL | Age: 64
Discharge: HOME | End: 2019-06-28
Attending: EMERGENCY MEDICINE
Payer: OTHER MISCELLANEOUS

## 2019-06-28 VITALS
SYSTOLIC BLOOD PRESSURE: 138 MMHG | RESPIRATION RATE: 16 BRPM | DIASTOLIC BLOOD PRESSURE: 65 MMHG | WEIGHT: 205 LBS | TEMPERATURE: 97.8 F | BODY MASS INDEX: 35 KG/M2 | HEART RATE: 88 BPM | HEIGHT: 64 IN | OXYGEN SATURATION: 100 %

## 2019-06-28 DIAGNOSIS — S39.012A ACUTE MYOFASCIAL STRAIN OF LUMBOSACRAL REGION, INITIAL ENCOUNTER: Primary | ICD-10-CM

## 2019-06-28 DIAGNOSIS — M54.16 LEFT LUMBAR RADICULITIS: ICD-10-CM

## 2019-06-28 PROCEDURE — 99283 EMERGENCY DEPT VISIT LOW MDM: CPT

## 2019-06-28 PROCEDURE — 99284 EMERGENCY DEPT VISIT MOD MDM: CPT | Performed by: PHYSICIAN ASSISTANT

## 2019-06-28 RX ORDER — DEXAMETHASONE 4 MG/1
4 TABLET ORAL ONCE
Status: COMPLETED | OUTPATIENT
Start: 2019-06-28 | End: 2019-06-28

## 2019-06-28 RX ORDER — CYCLOBENZAPRINE HCL 10 MG
10 TABLET ORAL 3 TIMES DAILY PRN
Qty: 9 TABLET | Refills: 0 | Status: SHIPPED | OUTPATIENT
Start: 2019-06-28 | End: 2019-08-26 | Stop reason: ALTCHOICE

## 2019-06-28 RX ORDER — DEXAMETHASONE 4 MG/1
4 TABLET ORAL
Qty: 5 TABLET | Refills: 0 | Status: SHIPPED | OUTPATIENT
Start: 2019-06-28 | End: 2019-07-03

## 2019-06-28 RX ADMIN — DEXAMETHASONE 4 MG: 4 TABLET ORAL at 10:41

## 2019-06-28 NOTE — ED PROVIDER NOTES
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  Was fighting hitting and kicking  Injured stopping him from climbing off the bed  Patient presents emergency room with complaints of left lower back pain that radiates to her left thigh  She denies any numbness, tingling, weakness  She has difficulty standing street as it did makes the pain worse  She has pain with the forward flexion as well as lateral flexion and twisting  She does not normally had back problems before she was injured by a patient, 4 days ago on June 24, 2019  The patient was combative and was kicking and swinging and she was trying to keep the patient from hurting himself  She states the pain is not improved with Tylenol at home  She states it is getting progressively worse  She denies any bowel or bladder incontinence  She denies any saddle anesthesia  Past medical history is positive for pulmonary embolism, DVT, GERD  History provided by:  Patient  Back Pain   Location:  Lumbar spine  Quality:  Aching  Radiates to:  L knee  Pain severity:  Moderate  Pain is:   Worse during the day  Onset quality:  Gradual  Timing:  Constant  Progression:  Waxing and waning  Chronicity:  New  Context: recent injury    Context: not emotional stress, not falling, not jumping from heights, not lifting heavy objects, not MCA, not MVA, not occupational injury, not pedestrian accident, not physical stress, not recent illness and not twisting    Relieved by:  Nothing  Worsened by:  Twisting, bending, sitting and movement  Ineffective treatments:  None tried  Associated symptoms: leg pain    Associated symptoms: no abdominal pain, no abdominal swelling, no bladder incontinence, no bowel incontinence, no chest pain, no dysuria, no fever, no headaches, no numbness, no paresthesias, no pelvic pain, no perianal numbness, no tingling, no weakness and no weight loss    Risk factors: menopause Risk factors: no hx of cancer, no hx of osteoporosis, no recent surgery, no steroid use and no vascular disease        Prior to Admission Medications   Prescriptions Last Dose Informant Patient Reported? Taking? FLUTICASONE PROPIONATE NA Not Taking at Unknown time Self Yes No   Sig: into each nostril   acetaminophen (TYLENOL) 325 mg tablet 6/28/2019 at Unknown time  Yes Yes   Sig: Take 1,000 mg by mouth 2 (two) times a day   famotidine (PEPCID) 20 mg tablet 6/28/2019 at Unknown time Self Yes Yes   Sig: Take 20 mg by mouth daily     rivaroxaban (XARELTO) 20 mg tablet   No No   Sig: Take 1 tablet (20 mg total) by mouth daily with breakfast      Facility-Administered Medications: None       Past Medical History:   Diagnosis Date    Allergic rhinitis     [facility]    Fracture     L radius/ulna occurrred 2/9/2018-OR correction today 2/19/2018    GERD (gastroesophageal reflux disease)     H  pylori infection     9/2017-treated    History of acute bronchitis     Pulmonary embolism ([facility] ) 2014    Wears glasses        Past Surgical History:   Procedure Laterality Date    COLONOSCOPY      ESOPHAGOGASTRODUODENOSCOPY      [place] COLONOSCOPY FLX DX W/COLLJ SPEC WHEN PFRMD N/A 9/29/2017    Procedure: EGD with bx  AND COLONOSCOPY;  Surgeon: Rosa Elena Michael DO;  Location: [facility]; Service: Gastroenterology    [place] OPEN RX DISTAL RADIUS FX, EXTRA-ARTICULAR Left 2/19/2018    Procedure: OPEN REDUCTION W/ INTERNAL FIXATION (ORIF) RADIUS / ULNA (WRIST); Surgeon: Papito Zambrano MD;  Location: [facility];  Service: Orthopedics       Family History   Problem Relation Age of Onset    Stomach cancer Mother     Lung cancer Father     Heart attack Brother      I have reviewed and agree with the history as documented      Social History     Tobacco Use    Smoking status: Never Smoker    Smokeless tobacco: Never Used   Substance Use Topics    Alcohol use: Yes     Comment: socially    Drug use: No        Review of Systems   Constitutional: Negative for activity change, appetite change, chills, diaphoresis, fatigue, fever and weight loss  HENT: Negative for congestion, dental problem, ear discharge, ear pain, mouth sores, postnasal drip, rhinorrhea and sore throat  Eyes: Negative for pain, discharge, redness and itching  Respiratory: Negative for cough, chest tightness and shortness of breath  Cardiovascular: Negative for chest pain  Gastrointestinal: Negative for abdominal pain and bowel incontinence  Genitourinary: Negative for bladder incontinence, dysuria, frequency, hematuria, pelvic pain and urgency  Musculoskeletal: Positive for back pain  Negative for gait problem  Skin: Negative for color change, pallor and rash  Neurological: Negative for tingling, weakness, numbness, headaches and paresthesias  Psychiatric/Behavioral: Negative for confusion  All other systems reviewed and are negative  Physical Exam  Physical Exam   Constitutional: She is oriented to person, place, and time  She appears well-developed and well-nourished  No distress  HENT:   Head: Normocephalic and atraumatic  Right Ear: External ear normal    Left Ear: External ear normal    Nose: Nose normal    Eyes: Conjunctivae are normal  Right eye exhibits no discharge  Left eye exhibits no discharge  Cardiovascular: Normal rate, regular rhythm and normal heart sounds  Exam reveals no gallop and no friction rub  No murmur heard  Pulmonary/Chest: Effort normal and breath sounds normal  No stridor  No respiratory distress  She has no wheezes  She has no rales  Musculoskeletal:   Examination of the lumbar spine-it is atraumatic inspection  There is left paravertebral palpated  There is decreased range of motion no reversal of the normal lordotic curve with forward flexion  This exacerbates the patient's symptoms    There are negative sitting root test   Reflexes are +2 and symmetrical   There is no weakness of the hallucis bilaterally  Neurological: She is alert and oriented to person, place, and time  Skin: Skin is warm  Capillary refill takes less than 2 seconds  She is not diaphoretic  Psychiatric: She has a normal mood and affect  Her behavior is normal  Judgment and thought content normal    Nursing note and vitals reviewed  Vital Signs  ED Triage Vitals [06/28/19 0957]   Temperature Pulse Respirations Blood Pressure SpO2   97 8 °F (36 6 °C) 88 16 138/65 100 %      Temp Source Heart Rate Source Patient Position - Orthostatic VS BP Location FiO2 (%)   Oral Monitor Sitting Right arm --      Pain Score       --           Vitals:    06/28/19 0957   BP: 138/65   Pulse: 88   Patient Position - Orthostatic VS: Sitting         Visual Acuity      ED Medications  Medications   dexamethasone (DECADRON) tablet 4 mg (4 mg Oral Given 6/28/19 1041)       Diagnostic Studies  Results Reviewed     None                 No orders to display              Procedures  Procedures       ED Course                               MDM  Number of Diagnoses or Management Options  Acute myofascial strain of lumbosacral region, initial encounter: new and does not require workup  Left lumbar radiculitis: new and does not require workup  Risk of Complications, Morbidity, and/or Mortality  Presenting problems: moderate  Diagnostic procedures: moderate  Management options: moderate  General comments: Patient presents emergency room for evaluation of her low back pain  She was caring for a patient, 4 days ago, who was combative and she injured her lower back  She complains of low back pain on the left paravertebral area that radiates to her left thigh  She was seen and evaluated and diagnosed with an acute lumbosacral strain with lumbar radiculitis  She was given a prescription for Decadron, 4 mg, take 1 daily for the next 5 days    She was given a prescription for Flexeril 10 mg 1 pill every 8 hours as needed for muscle loss  She wants to continue to work  Should her symptoms worsen, she will follow up with the [facility] for [employer] as this is a workman's Comp injury  Patient Progress  Patient progress: stable      Disposition  Final diagnoses:   Acute myofascial strain of lumbosacral region, initial encounter   Left lumbar radiculitis - L3-4     Time reflects when diagnosis was documented in both MDM as applicable and the Disposition within this note     Time User Action Codes Description Comment    6/28/2019 10:28 AM Jose Elias Sr Add [S39 012A] Acute myofascial strain of lumbosacral region, initial encounter     6/28/2019 10:28 AM Jose Elias Sr Add [M54 16] Left lumbar radiculitis     6/28/2019 10:28 AM Jose Elias Sr Modify [M54 16] Left lumbar radiculitis L3-4      ED Disposition     ED Disposition Condition Date/Time Comment    Discharge Stable Fri Jun 28, 2019 10:28 AM Star Yared discharge to home/self care              Follow-up Information     Follow up With Specialties Details Why Contact Info Additional Information    [facility]  Urgent Care Schedule an appointment as soon as possible for a visit in 2 days for a recheck if your zsymptoms persist or worsen [address], [address], [place], [address] [facility], [address]          Discharge Medication List as of 6/28/2019 10:31 AM      START taking these medications    Details   cyclobenzaprine (FLEXERIL) 10 mg tablet Take 1 tablet (10 mg total) by mouth 3 (three) times a day as needed for muscle spasms for up to 9 doses, Starting Fri 6/28/2019, Normal      dexamethasone (DECADRON) 4 mg tablet Take 1 tablet (4 mg total) by mouth daily with breakfast for 5 days, Starting Fri 6/28/2019, Until Wed 7/3/2019, Normal         CONTINUE these medications which have NOT CHANGED    Details   acetaminophen (TYLENOL) 325 mg tablet Take 1,000 mg by mouth 2 (two) times a day, Historical Med      famotidine (PEPCID) 20 mg tablet Take 20 mg by mouth daily  , Historical Med      FLUTICASONE PROPIONATE NA into each nostril, Historical Med      rivaroxaban (XARELTO) 20 mg tablet Take 1 tablet (20 mg total) by mouth daily with breakfast, Starting Mon 5/20/2019, Normal           No discharge procedures on file      ED Provider  Electronically Signed by           Fauzia Hancock PA-C  06/28/19 2519

## 2019-08-26 ENCOUNTER — OFFICE VISIT (OUTPATIENT)
Dept: FAMILY MEDICINE CLINIC | Facility: CLINIC | Age: 64
End: 2019-08-26

## 2019-08-26 VITALS
TEMPERATURE: 97.7 F | HEART RATE: 76 BPM | SYSTOLIC BLOOD PRESSURE: 140 MMHG | BODY MASS INDEX: 35.12 KG/M2 | WEIGHT: 204.6 LBS | DIASTOLIC BLOOD PRESSURE: 78 MMHG | RESPIRATION RATE: 16 BRPM

## 2019-08-26 DIAGNOSIS — Z13.6 SCREENING FOR CARDIOVASCULAR CONDITION: ICD-10-CM

## 2019-08-26 DIAGNOSIS — I26.99 OTHER PULMONARY EMBOLISM WITHOUT ACUTE COR PULMONALE, UNSPECIFIED CHRONICITY (HCC): ICD-10-CM

## 2019-08-26 DIAGNOSIS — R07.89 LEFT-SIDED CHEST WALL PAIN: Primary | ICD-10-CM

## 2019-08-26 DIAGNOSIS — D68.59 HYPERCOAGULABLE STATE (HCC): ICD-10-CM

## 2019-08-26 DIAGNOSIS — S39.012A ACUTE MYOFASCIAL STRAIN OF LUMBOSACRAL REGION, INITIAL ENCOUNTER: ICD-10-CM

## 2019-08-26 DIAGNOSIS — M54.16 LEFT LUMBAR RADICULITIS: ICD-10-CM

## 2019-08-26 DIAGNOSIS — Z12.31 SCREENING MAMMOGRAM, ENCOUNTER FOR: ICD-10-CM

## 2019-08-26 PROCEDURE — 99213 OFFICE O/P EST LOW 20 MIN: CPT | Performed by: FAMILY MEDICINE

## 2019-08-26 NOTE — PROGRESS NOTES
Assessment/Plan:         Problem List Items Addressed This Visit        Other    Screening mammogram, encounter for - Primary    Relevant Orders    Mammo screening bilateral w cad    Hypercoagulable state ([facility] )    Screening for cardiovascular condition    Relevant Orders    Comprehensive metabolic panel    Lipid panel    Left-sided chest wall pain     Onset since Friday extending from left upper back to and the left breast   Pain initially  achy and burning, patient also reports having noticed rash this morning  On physical exam, few erythematous skin tags on the lateral left chest  No obvious vesicular rash observed  Patient reports having had contact with patients with 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  Otherwise also reports history of shingles 10-15 years ago, patient unsure of which side of the body she had this  Has not received the shingles vaccine  Denies fever, chills, nausea and vomiting    - patient reassured  - patient advised to call the office/schedule an appointment if rash erupts, with typical natural history and presentation of shingles discussed  - Will prescribe antiviral medications if vesicular rash develops  - Pt understands and she is agreeable           Other Visit Diagnoses     Other pulmonary embolism without acute cor pulmonale, unspecified chronicity (HCC)        Relevant Medications    rivaroxaban (XARELTO) 20 mg tablet    Other Relevant Orders    CBC and differential            Subjective:      Patient ID: Kerri Benitez is a 59 y o  female  Rash   This is a new problem  The current episode started in the past 7 days  The problem is unchanged  Location: left anterolateral chest  Rash characteristics: Preceded by pain that was burning and achy on friday  Associated with: Shingles within the past month, pt works at [employer] [facility] [employer] ED  Pertinent negatives include no fever, shortness of breath or vomiting  (Chills) Past treatments include nothing   Her past medical history is significant for allergies  (Sulfa (rash))     Pt did not get the shingles vaccine and may have had Varicella as a child  Also reports history of shingles about 10-15 years ago  Patient on [address] where exactly on the body she had the rash  Review of Systems   Constitutional: Negative for fever  Respiratory: Negative for shortness of breath  Gastrointestinal: Negative for vomiting  Skin: Positive for rash  Objective:      /78 (BP Location: Left arm, Patient Position: Sitting, Cuff Size: Large)   Pulse 76   Temp 97 7 °F (36 5 °C) (Tympanic)   Resp 16   Wt 92 8 kg (204 lb 9 6 oz)   BMI 35 12 kg/m²          Physical Exam   Constitutional: She appears well-developed and well-nourished  HENT:   Head: Normocephalic and atraumatic  Eyes: Conjunctivae are normal    Neck: Normal range of motion  Cardiovascular: Normal rate, regular rhythm and normal heart sounds  Pulmonary/Chest: Effort normal and breath sounds normal    Musculoskeletal: Normal range of motion  Neurological: She is alert  Skin:   Tenderness to palpation left upper mid back extending to left anterior chest below left breast   Few skin tags seen on left lateral chest  No obvious vesicular rash observed   Vitals reviewed

## 2019-09-13 ENCOUNTER — HOSPITAL ENCOUNTER (OUTPATIENT)
Dept: RADIOLOGY | Age: 64
Discharge: HOME | End: 2019-09-13
Payer: COMMERCIAL

## 2019-09-13 VITALS — HEIGHT: 64 IN | WEIGHT: 201 LBS | BODY MASS INDEX: 34.31 KG/M2

## 2019-09-13 DIAGNOSIS — Z12.31 SCREENING MAMMOGRAM, ENCOUNTER FOR: ICD-10-CM

## 2019-09-13 PROCEDURE — 77067 SCR MAMMO BI INCL CAD: CPT

## 2019-09-25 ENCOUNTER — APPOINTMENT (OUTPATIENT)
Dept: LAB | Facility: CLINIC | Age: 64
End: 2019-09-25
Payer: COMMERCIAL

## 2019-09-25 DIAGNOSIS — Z13.6 SCREENING FOR CARDIOVASCULAR CONDITION: ICD-10-CM

## 2019-09-25 DIAGNOSIS — I26.99 OTHER PULMONARY EMBOLISM WITHOUT ACUTE COR PULMONALE, UNSPECIFIED CHRONICITY (HCC): ICD-10-CM

## 2019-09-25 LAB
ALBUMIN SERPL BCP-MCNC: 3.5 G/DL (ref 3.5–5)
ALP SERPL-CCNC: 23 U/L (ref 46–116)
ALT SERPL W P-5'-P-CCNC: 19 U/L (ref 12–78)
ANION GAP SERPL CALCULATED.3IONS-SCNC: 6 MMOL/L (ref 4–13)
AST SERPL W P-5'-P-CCNC: 18 U/L (ref 5–45)
BASOPHILS # BLD AUTO: 0.03 THOUSANDS/ΜL (ref 0–0.1)
BASOPHILS NFR BLD AUTO: 1 % (ref 0–1)
BILIRUB SERPL-MCNC: 0.4 MG/DL (ref 0.2–1)
BUN SERPL-MCNC: 18 MG/DL (ref 5–25)
CALCIUM SERPL-MCNC: 8.8 MG/DL (ref 8.3–10.1)
CHLORIDE SERPL-SCNC: 108 MMOL/L (ref 100–108)
CHOLEST SERPL-MCNC: 198 MG/DL (ref 50–200)
CO2 SERPL-SCNC: 28 MMOL/L (ref 21–32)
CREAT SERPL-MCNC: 0.81 MG/DL (ref 0.6–1.3)
EOSINOPHIL # BLD AUTO: 0.08 THOUSAND/ΜL (ref 0–0.61)
EOSINOPHIL NFR BLD AUTO: 2 % (ref 0–6)
ERYTHROCYTE [DISTWIDTH] IN BLOOD BY AUTOMATED COUNT: 12.8 % (ref 11.6–15.1)
GFR SERPL CREATININE-BSD FRML MDRD: 77 ML/MIN/1.73SQ M
GLUCOSE P FAST SERPL-MCNC: 104 MG/DL (ref 65–99)
HCT VFR BLD AUTO: 38.7 % (ref 34.8–46.1)
HDLC SERPL-MCNC: 58 MG/DL (ref 40–60)
HGB BLD-MCNC: 12.4 G/DL (ref 11.5–15.4)
IMM GRANULOCYTES # BLD AUTO: 0 THOUSAND/UL (ref 0–0.2)
IMM GRANULOCYTES NFR BLD AUTO: 0 % (ref 0–2)
LDLC SERPL CALC-MCNC: 133 MG/DL (ref 0–100)
LYMPHOCYTES # BLD AUTO: 1.78 THOUSANDS/ΜL (ref 0.6–4.47)
LYMPHOCYTES NFR BLD AUTO: 43 % (ref 14–44)
MCH RBC QN AUTO: 30.3 PG (ref 26.8–34.3)
MCHC RBC AUTO-ENTMCNC: 32 G/DL (ref 31.4–37.4)
MCV RBC AUTO: 95 FL (ref 82–98)
MONOCYTES # BLD AUTO: 0.47 THOUSAND/ΜL (ref 0.17–1.22)
MONOCYTES NFR BLD AUTO: 11 % (ref 4–12)
NEUTROPHILS # BLD AUTO: 1.78 THOUSANDS/ΜL (ref 1.85–7.62)
NEUTS SEG NFR BLD AUTO: 43 % (ref 43–75)
NONHDLC SERPL-MCNC: 140 MG/DL
NRBC BLD AUTO-RTO: 0 /100 WBCS
PLATELET # BLD AUTO: 204 THOUSANDS/UL (ref 149–390)
PMV BLD AUTO: 9.9 FL (ref 8.9–12.7)
POTASSIUM SERPL-SCNC: 4.6 MMOL/L (ref 3.5–5.3)
PROT SERPL-MCNC: 7.3 G/DL (ref 6.4–8.2)
RBC # BLD AUTO: 4.09 MILLION/UL (ref 3.81–5.12)
SODIUM SERPL-SCNC: 142 MMOL/L (ref 136–145)
TRIGL SERPL-MCNC: 37 MG/DL
WBC # BLD AUTO: 4.14 THOUSAND/UL (ref 4.31–10.16)

## 2019-09-25 PROCEDURE — 80053 COMPREHEN METABOLIC PANEL: CPT

## 2019-09-25 PROCEDURE — 80061 LIPID PANEL: CPT

## 2019-09-25 PROCEDURE — 36415 COLL VENOUS BLD VENIPUNCTURE: CPT

## 2019-09-25 PROCEDURE — 85025 COMPLETE CBC W/AUTO DIFF WBC: CPT

## 2019-12-30 ENCOUNTER — TELEPHONE (OUTPATIENT)
Dept: FAMILY MEDICINE CLINIC | Facility: CLINIC | Age: 64
End: 2019-12-30

## 2019-12-30 NOTE — TELEPHONE ENCOUNTER
Patient left voice message requesting refill of Xarelto 20mg  Needs to be scheduled for   Please call to schedule  Thanks!

## 2020-04-14 ENCOUNTER — TELEPHONE (OUTPATIENT)
Dept: FAMILY MEDICINE CLINIC | Facility: CLINIC | Age: 65
End: 2020-04-14

## 2020-04-14 DIAGNOSIS — I26.99 OTHER PULMONARY EMBOLISM WITHOUT ACUTE COR PULMONALE, UNSPECIFIED CHRONICITY (HCC): ICD-10-CM

## 2020-07-24 ENCOUNTER — TELEPHONE (OUTPATIENT)
Dept: FAMILY MEDICINE CLINIC | Facility: CLINIC | Age: 65
End: 2020-07-24

## 2020-07-29 ENCOUNTER — OFFICE VISIT (OUTPATIENT)
Dept: FAMILY MEDICINE CLINIC | Facility: CLINIC | Age: 65
End: 2020-07-29

## 2020-07-29 VITALS
HEART RATE: 88 BPM | HEIGHT: 64 IN | BODY MASS INDEX: 34.21 KG/M2 | WEIGHT: 200.4 LBS | RESPIRATION RATE: 18 BRPM | DIASTOLIC BLOOD PRESSURE: 60 MMHG | SYSTOLIC BLOOD PRESSURE: 120 MMHG | TEMPERATURE: 97.6 F

## 2020-07-29 DIAGNOSIS — Z11.59 ENCOUNTER FOR HEPATITIS C SCREENING TEST FOR LOW RISK PATIENT: ICD-10-CM

## 2020-07-29 DIAGNOSIS — R73.01 ELEVATED FASTING GLUCOSE: ICD-10-CM

## 2020-07-29 DIAGNOSIS — I26.99 OTHER PULMONARY EMBOLISM WITHOUT ACUTE COR PULMONALE, UNSPECIFIED CHRONICITY (HCC): ICD-10-CM

## 2020-07-29 DIAGNOSIS — Z00.00 HEALTHCARE MAINTENANCE: Primary | ICD-10-CM

## 2020-07-29 DIAGNOSIS — Z11.4 SCREENING FOR HIV (HUMAN IMMUNODEFICIENCY VIRUS): ICD-10-CM

## 2020-07-29 DIAGNOSIS — Z12.31 SCREENING MAMMOGRAM, ENCOUNTER FOR: ICD-10-CM

## 2020-07-29 PROCEDURE — 99397 PER PM REEVAL EST PAT 65+ YR: CPT | Performed by: PHYSICIAN ASSISTANT

## 2020-07-29 NOTE — PROGRESS NOTES
Assessment/Plan:    Healthcare maintenance  No abnormal findings on physical exam  Will check routine labs  Dexa scan ordered  Pt decline pap, last pap 2015 normal    BMI Counseling: Body mass index is 34 4 kg/m²  The BMI is above normal  Nutrition recommendations include reducing portion sizes, decreasing overall calorie intake, 3-5 servings of fruits/vegetables daily, reducing fast food intake, moderation in carbohydrate intake, increasing intake of lean protein and reducing intake of saturated fat and trans fat  Exercise recommendations include exercising 3-5 times per week and joining a gym  Screening mammogram, encounter for  Ordered mammogram    Screening for HIV (human immunodeficiency virus)  Check screening HIV    Other pulmonary embolism without acute cor pulmonale (HCC)  Stable continue Xarelto      Diagnoses and all orders for this visit:    Healthcare maintenance  -     Mammo screening bilateral w 3d & cad; Future  -     Lipid panel; Future  -     Comprehensive metabolic panel; Future  -     CBC and differential; Future  -     DXA bone density spine hip and pelvis; Future    Encounter for hepatitis C screening test for low risk patient  -     Hepatitis C antibody; Future    Screening for HIV (human immunodeficiency virus)  -     HIV 1/2 Antigen/Antibody (4th Generation) w Reflex [facility]; Future    Other pulmonary embolism without acute cor pulmonale, unspecified chronicity (HCC)  -     rivaroxaban (Xarelto) 20 mg tablet; Take 1 tablet (20 mg total) by mouth daily with breakfast    Elevated fasting glucose  -     Hemoglobin A1C; Future    Screening mammogram, encounter for        Subjective:      Patient ID: Jessica Connors is a 72 y o  female presents today for annual physical and medication refill  HPI   Pt is doing well  No concerns reported  Requesting refill on xarelto which she takes for h/o PE  Does not exercise or follows healthy eating habits       The following portions of the patient's history were reviewed and updated as appropriate: allergies, current medications, past family history, past medical history, past social history, past surgical history and problem list     Review of Systems   Constitutional: Negative for chills, fatigue and fever  Respiratory: Negative for cough, chest tightness, shortness of breath and wheezing  Cardiovascular: Negative for chest pain and palpitations  Gastrointestinal: Negative for abdominal pain, constipation, diarrhea, nausea and vomiting  Genitourinary: Negative for difficulty urinating  Musculoskeletal: Negative for arthralgias, myalgias, neck pain and neck stiffness  Skin: Negative for rash and wound  Neurological: Negative for dizziness, weakness, light-headedness, numbness and headaches  Psychiatric/Behavioral: Negative for agitation and behavioral problems  The patient is not nervous/anxious  Objective:      /60 (BP Location: Left arm, Patient Position: Sitting, Cuff Size: Large)   Pulse 88   Temp 97 6 °F (36 4 °C) (Tympanic)   Resp 18   Ht 5' 4" (1 626 m)   Wt 90 9 kg (200 lb 6 4 oz)   BMI 34 40 kg/m²          Physical Exam   Constitutional: She is oriented to person, place, and time  She appears well-developed and well-nourished  No distress  HENT:   Head: Normocephalic and atraumatic  Right Ear: External ear normal    Left Ear: External ear normal    Nose: Nose normal    Mouth/Throat: Oropharynx is clear and moist  No oropharyngeal exudate  Eyes: Pupils are equal, round, and reactive to light  Conjunctivae are normal    Neck: Normal range of motion  Neck supple  Cardiovascular: Normal rate, regular rhythm and normal heart sounds  No murmur heard  Pulmonary/Chest: Effort normal and breath sounds normal  No respiratory distress  She has no wheezes  Abdominal: Soft  Bowel sounds are normal  She exhibits no mass  There is no tenderness  There is no guarding     Musculoskeletal: She exhibits no edema or deformity  Lymphadenopathy:     She has no cervical adenopathy  Neurological: She is alert and oriented to person, place, and time  Skin: Skin is warm and dry  No erythema  No pallor  Psychiatric: She has a normal mood and affect   Her behavior is normal  Judgment and thought content normal

## 2020-07-29 NOTE — ASSESSMENT & PLAN NOTE
No abnormal findings on physical exam  Will check routine labs  Dexa scan ordered  Pt decline pap, last pap 2015 normal    BMI Counseling: Body mass index is 34 4 kg/m²  The BMI is above normal  Nutrition recommendations include reducing portion sizes, decreasing overall calorie intake, 3-5 servings of fruits/vegetables daily, reducing fast food intake, moderation in carbohydrate intake, increasing intake of lean protein and reducing intake of saturated fat and trans fat  Exercise recommendations include exercising 3-5 times per week and joining a gym

## 2020-09-23 ENCOUNTER — APPOINTMENT (OUTPATIENT)
Dept: LAB | Facility: CLINIC | Age: 65
End: 2020-09-23
Payer: COMMERCIAL

## 2020-09-23 ENCOUNTER — TRANSCRIBE ORDERS (OUTPATIENT)
Dept: LAB | Facility: CLINIC | Age: 65
End: 2020-09-23

## 2020-09-23 DIAGNOSIS — Z11.59 ENCOUNTER FOR HEPATITIS C SCREENING TEST FOR LOW RISK PATIENT: ICD-10-CM

## 2020-09-23 DIAGNOSIS — R73.01 ELEVATED FASTING GLUCOSE: ICD-10-CM

## 2020-09-23 DIAGNOSIS — Z00.00 HEALTHCARE MAINTENANCE: ICD-10-CM

## 2020-09-23 DIAGNOSIS — Z11.4 SCREENING FOR HIV (HUMAN IMMUNODEFICIENCY VIRUS): ICD-10-CM

## 2020-09-23 LAB
ALBUMIN SERPL BCP-MCNC: 3.6 G/DL (ref 3.5–5)
ALP SERPL-CCNC: 22 U/L (ref 46–116)
ALT SERPL W P-5'-P-CCNC: 28 U/L (ref 12–78)
ANION GAP SERPL CALCULATED.3IONS-SCNC: 8 MMOL/L (ref 4–13)
AST SERPL W P-5'-P-CCNC: 25 U/L (ref 5–45)
BASOPHILS # BLD AUTO: 0.03 THOUSANDS/ΜL (ref 0–0.1)
BASOPHILS NFR BLD AUTO: 1 % (ref 0–1)
BILIRUB SERPL-MCNC: 0.49 MG/DL (ref 0.2–1)
BUN SERPL-MCNC: 17 MG/DL (ref 5–25)
CALCIUM SERPL-MCNC: 9.2 MG/DL (ref 8.3–10.1)
CHLORIDE SERPL-SCNC: 105 MMOL/L (ref 100–108)
CHOLEST SERPL-MCNC: 222 MG/DL (ref 50–200)
CO2 SERPL-SCNC: 27 MMOL/L (ref 21–32)
CREAT SERPL-MCNC: 0.76 MG/DL (ref 0.6–1.3)
EOSINOPHIL # BLD AUTO: 0.1 THOUSAND/ΜL (ref 0–0.61)
EOSINOPHIL NFR BLD AUTO: 2 % (ref 0–6)
ERYTHROCYTE [DISTWIDTH] IN BLOOD BY AUTOMATED COUNT: 12.4 % (ref 11.6–15.1)
EST. AVERAGE GLUCOSE BLD GHB EST-MCNC: 123 MG/DL
GFR SERPL CREATININE-BSD FRML MDRD: 83 ML/MIN/1.73SQ M
GLUCOSE P FAST SERPL-MCNC: 104 MG/DL (ref 65–99)
HBA1C MFR BLD: 5.9 %
HCT VFR BLD AUTO: 38.6 % (ref 34.8–46.1)
HCV AB SER QL: NORMAL
HDLC SERPL-MCNC: 69 MG/DL
HGB BLD-MCNC: 12.3 G/DL (ref 11.5–15.4)
IMM GRANULOCYTES # BLD AUTO: 0.01 THOUSAND/UL (ref 0–0.2)
IMM GRANULOCYTES NFR BLD AUTO: 0 % (ref 0–2)
LDLC SERPL CALC-MCNC: 141 MG/DL (ref 0–100)
LYMPHOCYTES # BLD AUTO: 1.86 THOUSANDS/ΜL (ref 0.6–4.47)
LYMPHOCYTES NFR BLD AUTO: 46 % (ref 14–44)
MCH RBC QN AUTO: 30.1 PG (ref 26.8–34.3)
MCHC RBC AUTO-ENTMCNC: 31.9 G/DL (ref 31.4–37.4)
MCV RBC AUTO: 95 FL (ref 82–98)
MONOCYTES # BLD AUTO: 0.37 THOUSAND/ΜL (ref 0.17–1.22)
MONOCYTES NFR BLD AUTO: 9 % (ref 4–12)
NEUTROPHILS # BLD AUTO: 1.74 THOUSANDS/ΜL (ref 1.85–7.62)
NEUTS SEG NFR BLD AUTO: 42 % (ref 43–75)
NONHDLC SERPL-MCNC: 153 MG/DL
NRBC BLD AUTO-RTO: 0 /100 WBCS
PLATELET # BLD AUTO: 210 THOUSANDS/UL (ref 149–390)
PMV BLD AUTO: 10.4 FL (ref 8.9–12.7)
POTASSIUM SERPL-SCNC: 4.6 MMOL/L (ref 3.5–5.3)
PROT SERPL-MCNC: 7.4 G/DL (ref 6.4–8.2)
RBC # BLD AUTO: 4.08 MILLION/UL (ref 3.81–5.12)
SODIUM SERPL-SCNC: 140 MMOL/L (ref 136–145)
TRIGL SERPL-MCNC: 60 MG/DL
WBC # BLD AUTO: 4.11 THOUSAND/UL (ref 4.31–10.16)

## 2020-09-23 PROCEDURE — 87389 HIV-1 AG W/HIV-1&-2 AB AG IA: CPT

## 2020-09-23 PROCEDURE — 85025 COMPLETE CBC W/AUTO DIFF WBC: CPT

## 2020-09-23 PROCEDURE — 83036 HEMOGLOBIN GLYCOSYLATED A1C: CPT

## 2020-09-23 PROCEDURE — 80061 LIPID PANEL: CPT

## 2020-09-23 PROCEDURE — 36415 COLL VENOUS BLD VENIPUNCTURE: CPT

## 2020-09-23 PROCEDURE — 86803 HEPATITIS C AB TEST: CPT

## 2020-09-23 PROCEDURE — 80053 COMPREHEN METABOLIC PANEL: CPT

## 2020-09-24 ENCOUNTER — TELEPHONE (OUTPATIENT)
Dept: FAMILY MEDICINE CLINIC | Facility: CLINIC | Age: 65
End: 2020-09-24

## 2020-09-24 LAB — HIV 1+2 AB+HIV1 P24 AG SERPL QL IA: NORMAL

## 2020-11-11 ENCOUNTER — TELEPHONE (OUTPATIENT)
Dept: FAMILY MEDICINE CLINIC | Facility: CLINIC | Age: 65
End: 2020-11-11

## 2020-11-16 DIAGNOSIS — I26.99 OTHER PULMONARY EMBOLISM WITHOUT ACUTE COR PULMONALE, UNSPECIFIED CHRONICITY (HCC): ICD-10-CM

## 2020-12-19 ENCOUNTER — IMMUNIZATIONS (OUTPATIENT)
Dept: FAMILY MEDICINE CLINIC | Facility: HOSPITAL | Age: 65
End: 2020-12-19
Payer: COMMERCIAL

## 2020-12-19 DIAGNOSIS — Z23 ENCOUNTER FOR IMMUNIZATION: ICD-10-CM

## 2020-12-19 PROCEDURE — 0001A SARS-COV-2 / COVID-19 MRNA VACCINE (PFIZER-BIONTECH) 30 MCG: CPT

## 2020-12-19 PROCEDURE — 91300 SARS-COV-2 / COVID-19 MRNA VACCINE (PFIZER-BIONTECH) 30 MCG: CPT

## 2021-01-07 ENCOUNTER — IMMUNIZATIONS (OUTPATIENT)
Dept: FAMILY MEDICINE CLINIC | Facility: HOSPITAL | Age: 66
End: 2021-01-07

## 2021-01-07 DIAGNOSIS — Z23 ENCOUNTER FOR IMMUNIZATION: ICD-10-CM

## 2021-01-07 PROCEDURE — 0002A SARS-COV-2 / COVID-19 MRNA VACCINE (PFIZER-BIONTECH) 30 MCG: CPT

## 2021-01-07 PROCEDURE — 91300 SARS-COV-2 / COVID-19 MRNA VACCINE (PFIZER-BIONTECH) 30 MCG: CPT

## 2021-02-16 DIAGNOSIS — I26.99 OTHER PULMONARY EMBOLISM WITHOUT ACUTE COR PULMONALE, UNSPECIFIED CHRONICITY (HCC): ICD-10-CM

## 2021-05-24 DIAGNOSIS — I26.99 OTHER PULMONARY EMBOLISM WITHOUT ACUTE COR PULMONALE, UNSPECIFIED CHRONICITY (HCC): ICD-10-CM

## 2021-08-19 DIAGNOSIS — I26.99 OTHER PULMONARY EMBOLISM WITHOUT ACUTE COR PULMONALE, UNSPECIFIED CHRONICITY (HCC): ICD-10-CM

## 2021-09-14 ENCOUNTER — APPOINTMENT (OUTPATIENT)
Dept: LAB | Facility: CLINIC | Age: 66
End: 2021-09-14

## 2021-09-14 DIAGNOSIS — Z00.8 HEALTH EXAMINATION IN POPULATION SURVEY: ICD-10-CM

## 2021-09-14 LAB
CHOLEST SERPL-MCNC: 223 MG/DL (ref 50–200)
EST. AVERAGE GLUCOSE BLD GHB EST-MCNC: 117 MG/DL
HBA1C MFR BLD: 5.7 %
HDLC SERPL-MCNC: 72 MG/DL
LDLC SERPL CALC-MCNC: 134 MG/DL (ref 0–100)
NONHDLC SERPL-MCNC: 151 MG/DL
TRIGL SERPL-MCNC: 85 MG/DL

## 2021-09-14 PROCEDURE — 36415 COLL VENOUS BLD VENIPUNCTURE: CPT

## 2021-09-14 PROCEDURE — 80061 LIPID PANEL: CPT

## 2021-09-14 PROCEDURE — 83036 HEMOGLOBIN GLYCOSYLATED A1C: CPT

## 2021-10-27 ENCOUNTER — OFFICE VISIT (OUTPATIENT)
Dept: FAMILY MEDICINE CLINIC | Facility: CLINIC | Age: 66
End: 2021-10-27

## 2021-10-27 VITALS
HEART RATE: 87 BPM | TEMPERATURE: 97.4 F | RESPIRATION RATE: 18 BRPM | WEIGHT: 206.4 LBS | HEIGHT: 64 IN | BODY MASS INDEX: 35.24 KG/M2 | SYSTOLIC BLOOD PRESSURE: 142 MMHG | OXYGEN SATURATION: 97 % | DIASTOLIC BLOOD PRESSURE: 82 MMHG

## 2021-10-27 DIAGNOSIS — Z12.31 ENCOUNTER FOR SCREENING MAMMOGRAM FOR BREAST CANCER: ICD-10-CM

## 2021-10-27 DIAGNOSIS — Z78.0 ASYMPTOMATIC POSTMENOPAUSAL STATE: ICD-10-CM

## 2021-10-27 DIAGNOSIS — Z00.00 ANNUAL PHYSICAL EXAM: Primary | ICD-10-CM

## 2021-10-27 PROBLEM — Z11.4 SCREENING FOR HIV (HUMAN IMMUNODEFICIENCY VIRUS): Status: RESOLVED | Noted: 2020-07-29 | Resolved: 2021-10-27

## 2021-10-27 PROCEDURE — 99397 PER PM REEVAL EST PAT 65+ YR: CPT | Performed by: PHYSICIAN ASSISTANT

## 2021-11-06 ENCOUNTER — IMMUNIZATIONS (OUTPATIENT)
Dept: FAMILY MEDICINE CLINIC | Facility: HOSPITAL | Age: 66
End: 2021-11-06

## 2021-11-06 DIAGNOSIS — Z23 ENCOUNTER FOR IMMUNIZATION: Primary | ICD-10-CM

## 2021-11-06 PROCEDURE — 0001A COVID-19 PFIZER VACC 0.3 ML: CPT

## 2021-11-06 PROCEDURE — 91300 COVID-19 PFIZER VACC 0.3 ML: CPT

## 2021-11-19 DIAGNOSIS — I26.99 OTHER PULMONARY EMBOLISM WITHOUT ACUTE COR PULMONALE, UNSPECIFIED CHRONICITY (HCC): Primary | ICD-10-CM

## 2021-11-19 DIAGNOSIS — I26.99 OTHER PULMONARY EMBOLISM WITHOUT ACUTE COR PULMONALE, UNSPECIFIED CHRONICITY (HCC): ICD-10-CM

## 2022-05-16 DIAGNOSIS — I26.99 OTHER PULMONARY EMBOLISM WITHOUT ACUTE COR PULMONALE, UNSPECIFIED CHRONICITY (HCC): ICD-10-CM

## 2022-05-16 NOTE — TELEPHONE ENCOUNTER
Received voicemail from patient on 05/16/2022 @ 03:09PM for medication refill of Rivaroxaban (Xarelto) 20mg tablets  Patient is requesting a 90 day supply sent to [address]  Thanks!

## 2022-12-12 DIAGNOSIS — I26.99 OTHER PULMONARY EMBOLISM WITHOUT ACUTE COR PULMONALE, UNSPECIFIED CHRONICITY (HCC): ICD-10-CM

## 2022-12-12 NOTE — TELEPHONE ENCOUNTER
Last visit was in 10/2021; in order to continue refills she also needed blood work to be done (on the previous refill that was advised and order placed, but she has not have it done and order is now )  Please schedule patient to establish with new PCP and medication refill if appropriate

## 2022-12-12 NOTE — TELEPHONE ENCOUNTER
Patient recently followed by [facility], are you able to refill Rx?

## 2022-12-15 DIAGNOSIS — I26.99 OTHER PULMONARY EMBOLISM WITHOUT ACUTE COR PULMONALE, UNSPECIFIED CHRONICITY (HCC): ICD-10-CM

## 2022-12-15 NOTE — TELEPHONE ENCOUNTER
Patient called made appointment 12/20 and needs medication before appointment   Patient has not had medicine for 1 day and feels it    Patient can be reached at [phone]

## 2022-12-21 ENCOUNTER — OFFICE VISIT (OUTPATIENT)
Dept: FAMILY MEDICINE CLINIC | Facility: CLINIC | Age: 67
End: 2022-12-21

## 2022-12-21 VITALS
SYSTOLIC BLOOD PRESSURE: 134 MMHG | OXYGEN SATURATION: 99 % | DIASTOLIC BLOOD PRESSURE: 77 MMHG | HEART RATE: 82 BPM | HEIGHT: 63 IN | WEIGHT: 202.6 LBS | BODY MASS INDEX: 35.9 KG/M2 | TEMPERATURE: 97.9 F

## 2022-12-21 DIAGNOSIS — N39.46 MIXED STRESS AND URGE URINARY INCONTINENCE: ICD-10-CM

## 2022-12-21 DIAGNOSIS — Z13.820 SCREENING FOR OSTEOPOROSIS: ICD-10-CM

## 2022-12-21 DIAGNOSIS — I26.99 OTHER PULMONARY EMBOLISM WITHOUT ACUTE COR PULMONALE, UNSPECIFIED CHRONICITY (HCC): ICD-10-CM

## 2022-12-21 DIAGNOSIS — Z12.31 ENCOUNTER FOR SCREENING MAMMOGRAM FOR BREAST CANCER: ICD-10-CM

## 2022-12-21 DIAGNOSIS — E78.5 DYSLIPIDEMIA: ICD-10-CM

## 2022-12-21 DIAGNOSIS — Z00.00 ANNUAL PHYSICAL EXAM: Primary | ICD-10-CM

## 2022-12-21 DIAGNOSIS — R73.03 PRE-DIABETES: ICD-10-CM

## 2022-12-21 NOTE — PROGRESS NOTES
[address] [facility]    NAME: Sharda Leger  AGE: 79 y o  SEX: female  : 1955     DATE: 2022     Assessment and Plan:     Problem List Items Addressed This Visit        Cardiovascular and Mediastinum    Other pulmonary embolism without acute cor pulmonale ([facility] [address] )     - developed L DVT and pulmonary embolism in , has been on xarelto since then with no recurrent episodes  - denies any episodes of abnormal bleeding  - continue on xarelto 20 mg daily         Relevant Medications    rivaroxaban (Xarelto) 20 mg tablet       Other    Annual physical exam - Primary     - routine labs ordered today: CMP, lipids, A1c  - screening for mammogram: last 2019, ordered today  - screening for cervical cancer: last was , WNL  - dexa: due for this, ordered today  - screening for colonoscopy: last was , f/u in 10 years    - counseled pt on healthy eating, exercise           Pre-diabetes     - last A1c 2021 was 5 7  - reports poor diet - eats a lot of fatty foods and sweets   - walks dog for 30 minutes daily but that is the only exercise  - counseled regarding diet   - f/u labs          Relevant Orders    Comprehensive metabolic panel    Hemoglobin A1C    Mixed stress and urge urinary incontinence     - describes mix of urge and stress incontinence   - would like to try pelvic flow therapy at this time, will follow up          Relevant Orders    Ambulatory Referral to Physical Therapy   Other Visit Diagnoses     Dyslipidemia        Relevant Orders    Lipid panel    Screening for osteoporosis        Relevant Orders    DXA bone density spine hip and pelvis    Encounter for screening mammogram for breast cancer        Relevant Orders    Mammo screening bilateral w 3d & cad          Immunizations and preventive care screenings were discussed with patient today   Appropriate education was printed on patient's after visit summary  Counseling:  Alcohol/drug use: discussed moderation in alcohol intake, the recommendations for healthy alcohol use, and avoidance of illicit drug use  Dental Health: discussed importance of regular tooth brushing, flossing, and dental visits  Injury prevention: discussed safety/seat belts, safety helmets, smoke detectors, carbon dioxide detectors, and smoking near bedding or upholstery  · Exercise: the importance of regular exercise/physical activity was discussed  Recommend exercise 3-5 times per week for at least 30 minutes  BMI Counseling: Body mass index is 35 55 kg/m²  The BMI is above normal  Nutrition recommendations include encouraging healthy choices of fruits and vegetables, consuming healthier snacks, limiting drinks that contain sugar, moderation in carbohydrate intake, increasing intake of lean protein, reducing intake of saturated and trans fat and reducing intake of cholesterol  Exercise recommendations include moderate physical activity 150 minutes/week  Rationale for BMI follow-up plan is due to patient being overweight or obese  Depression Screening and Follow-up Plan: Patient was screened for depression during today's encounter  They screened negative with a PHQ-2 score of 0  Return in 6 months (on 6/21/2023)  Chief Complaint:     Chief Complaint   Patient presents with   • Annual Exam      History of Present Illness:     Adult Annual Physical   Patient here for a comprehensive physical exam  The patient reports no problems  Does inquire about episodes of urge incontinence and leaking of urine due to cough/laugh, etc      Diet and Physical Activity  · Diet/Nutrition: poor diet, frequent junk food, high fat diet, limited fruits/vegetables and adequate fiber intake  · Exercise: walking and walks dog for a couple blocks which is abouts 0 5 miles a day whcih is 30 mins        Depression Screening  PHQ-2/9 Depression Screening    Little interest or pleasure in doing things: 0 - not at all  Feeling down, depressed, or hopeless: 0 - not at all  PHQ-2 Score: 0  PHQ-2 Interpretation: Negative depression screen       General Health  · Sleep: gets more than 8 hours of sleep on average and refreshing sleep  · Hearing: normal - bilateral   · Vision: sees eye doc once a year; has some floater, sees Dr Romina Guerrero, due for appt  · Dental: regular dental visits and getting implants in 4/2023  /GYN Health  · Patient is: postmenopausal, reached in early 46s  · Last menstrual period: does not remember when it was     Review of Systems:     Review of Systems   Constitutional: Negative for chills and fever  HENT: Negative for congestion and rhinorrhea  Eyes: Negative for visual disturbance  Respiratory: Negative for cough and shortness of breath  Cardiovascular: Negative for chest pain and palpitations  Gastrointestinal: Negative for abdominal pain, constipation, diarrhea, nausea and vomiting  Genitourinary: Negative for dysuria  Urge and stress incontinence    Musculoskeletal: Negative for arthralgias  Skin: Negative for rash  Neurological: Negative for dizziness, light-headedness and headaches  Past Medical History:     Past Medical History:   Diagnosis Date   • Allergic rhinitis    • DVT of lower extremity (deep venous thrombosis) ([facility] ) 2014   • Fracture     L radius/ulna occurrred 2/9/2018-OR correction today 2/19/2018   • GERD (gastroesophageal reflux disease)    • H  pylori infection     9/2017-treated   • History of acute bronchitis    • Pulmonary embolism ([facility] ) 2014   • Wears glasses       Past Surgical History:     Past Surgical History:   Procedure Laterality Date   • COLONOSCOPY     • ESOPHAGOGASTRODUODENOSCOPY     • MI COLONOSCOPY FLX DX W/COLLJ SPEC WHEN PFRMD N/A 9/29/2017    Procedure: EGD with bx  AND COLONOSCOPY;  Surgeon: Dony Alcantar DO;  Location: [facility];   Service: Gastroenterology   • MI OPEN RX DISTAL RADIUS FX, EXTRA-ARTICULAR Left 2/19/2018    Procedure: OPEN REDUCTION W/ INTERNAL FIXATION (ORIF) RADIUS / ULNA (WRIST); Surgeon: Abhi Arias MD;  Location: [facility];  Service: Orthopedics      Social History:     Social History     Socioeconomic History   • Marital status: Single     Spouse name: None   • Number of children: None   • Years of education: None   • Highest education level: None   Occupational History   • None   Tobacco Use   • Smoking status: Never   • Smokeless tobacco: Never   Vaping Use   • Vaping Use: Never used   Substance and Sexual Activity   • Alcohol use: Yes     Comment: socially   • Drug use: No   • Sexual activity: Not Currently     Partners: Male   Other Topics Concern   • None   Social History Narrative   • None     Social Determinants of Health     Financial Resource Strain: Low Risk    • Difficulty of Paying Living Expenses: Not very hard   Food Insecurity: No Food Insecurity   • Worried About Running Out of Food in the Last Year: Never true   • Ran Out of Food in the Last Year: Never true   Transportation Needs: No Transportation Needs   • Lack of Transportation (Medical): No   • Lack of Transportation (Non-Medical):  No   Physical Activity: Not on file   Stress: Not on file   Social Connections: Not on file   Intimate Partner Violence: Not At Risk   • Fear of Current or Ex-Partner: No   • Emotionally Abused: No   • Physically Abused: No   • Sexually Abused: No   Housing Stability: Not on file      Family History:     Family History   Problem Relation Age of Onset   • Stomach cancer Mother    • Lung cancer Father    • Heart attack Brother    • No Known Problems Sister    • No Known Problems Maternal Grandmother    • No Known Problems Maternal Grandfather    • No Known Problems Paternal Grandmother    • No Known Problems Paternal Grandfather    • No Known Problems Half-Sister    • No Known Problems Half-Sister    • No Known Problems Maternal Aunt    • No Known Problems Paternal Aunt    • Breast cancer Cousin 48      Current Medications:     Current Outpatient Medications   Medication Sig Dispense Refill   • acetaminophen (TYLENOL) 325 mg tablet Take 1,000 mg by mouth 2 (two) times a day     • famotidine (PEPCID) 20 mg tablet Take 20 mg by mouth daily       • rivaroxaban (Xarelto) 20 mg tablet Take 1 tablet (20 mg total) by mouth daily with breakfast 90 tablet 2     No current facility-administered medications for this visit  Allergies: Allergies   Allergen Reactions   • Sulfa Antibiotics Rash      Physical Exam:     /77 (BP Location: Left arm, Patient Position: Sitting, Cuff Size: Large)   Pulse 82   Temp 97 9 °F (36 6 °C) (Temporal)   Ht 5' 3 3" (1 608 m)   Wt 91 9 kg (202 lb 9 6 oz)   SpO2 99%   BMI 35 55 kg/m²     Physical Exam  Vitals reviewed  Constitutional:       General: She is not in acute distress  Appearance: She is obese  HENT:      Head: Normocephalic and atraumatic  Right Ear: External ear normal       Left Ear: External ear normal       Nose: Nose normal       Mouth/Throat:      Pharynx: Oropharynx is clear  Eyes:      Extraocular Movements: Extraocular movements intact  Conjunctiva/sclera: Conjunctivae normal    Cardiovascular:      Rate and Rhythm: Normal rate and regular rhythm  Pulses: Normal pulses  Heart sounds: Normal heart sounds  Pulmonary:      Effort: Pulmonary effort is normal       Breath sounds: Normal breath sounds  Abdominal:      Palpations: Abdomen is soft  Musculoskeletal:      Cervical back: Neck supple  Right lower leg: Edema (trace) present  Left lower leg: Edema (trace) present  Skin:     General: Skin is warm  Capillary Refill: Capillary refill takes less than 2 seconds  Neurological:      Mental Status: She is alert and oriented to person, place, and time  Psychiatric:         Mood and Affect: Mood normal          Behavior: Behavior normal          Thought Content:  Thought content normal          Judgment: Judgment normal           Mary Beth Guillen 18

## 2022-12-21 NOTE — ASSESSMENT & PLAN NOTE
- describes mix of urge and stress incontinence   - would like to try pelvic flow therapy at this time, will follow up

## 2022-12-21 NOTE — ASSESSMENT & PLAN NOTE
- developed L DVT and pulmonary embolism in 2014, has been on xarelto since then with no recurrent episodes  - denies any episodes of abnormal bleeding  - continue on xarelto 20 mg daily

## 2022-12-21 NOTE — ASSESSMENT & PLAN NOTE
- last A1c 9/2021 was 5 7  - reports poor diet - eats a lot of fatty foods and sweets   - walks dog for 30 minutes daily but that is the only exercise  - counseled regarding diet   - f/u labs

## 2022-12-21 NOTE — PATIENT INSTRUCTIONS

## 2022-12-21 NOTE — ASSESSMENT & PLAN NOTE
- routine labs ordered today: CMP, lipids, A1c  - screening for mammogram: last 9/2019, ordered today  - screening for cervical cancer: last was 2015, WNL  - dexa: due for this, ordered today  - screening for colonoscopy: last was 2017, f/u in 10 years    - counseled pt on healthy eating, exercise

## 2022-12-23 ENCOUNTER — APPOINTMENT (OUTPATIENT)
Dept: LAB | Facility: CLINIC | Age: 67
End: 2022-12-23

## 2022-12-23 DIAGNOSIS — E78.5 DYSLIPIDEMIA: ICD-10-CM

## 2022-12-23 DIAGNOSIS — R73.03 PRE-DIABETES: ICD-10-CM

## 2022-12-23 LAB
ALBUMIN SERPL BCP-MCNC: 3.9 G/DL (ref 3.5–5)
ALP SERPL-CCNC: 18 U/L (ref 34–104)
ALT SERPL W P-5'-P-CCNC: 14 U/L (ref 7–52)
ANION GAP SERPL CALCULATED.3IONS-SCNC: 4 MMOL/L (ref 4–13)
AST SERPL W P-5'-P-CCNC: 17 U/L (ref 13–39)
BILIRUB SERPL-MCNC: 0.41 MG/DL (ref 0.2–1)
BUN SERPL-MCNC: 19 MG/DL (ref 5–25)
CALCIUM SERPL-MCNC: 9.1 MG/DL (ref 8.4–10.2)
CHLORIDE SERPL-SCNC: 108 MMOL/L (ref 96–108)
CHOLEST SERPL-MCNC: 207 MG/DL
CO2 SERPL-SCNC: 29 MMOL/L (ref 21–32)
CREAT SERPL-MCNC: 0.73 MG/DL (ref 0.6–1.3)
EST. AVERAGE GLUCOSE BLD GHB EST-MCNC: 117 MG/DL
GFR SERPL CREATININE-BSD FRML MDRD: 85 ML/MIN/1.73SQ M
GLUCOSE P FAST SERPL-MCNC: 109 MG/DL (ref 65–99)
HBA1C MFR BLD: 5.7 %
HDLC SERPL-MCNC: 65 MG/DL
LDLC SERPL CALC-MCNC: 130 MG/DL (ref 0–100)
NONHDLC SERPL-MCNC: 142 MG/DL
POTASSIUM SERPL-SCNC: 4.9 MMOL/L (ref 3.5–5.3)
PROT SERPL-MCNC: 6.9 G/DL (ref 6.4–8.4)
SODIUM SERPL-SCNC: 141 MMOL/L (ref 135–147)
TRIGL SERPL-MCNC: 58 MG/DL

## 2023-10-06 ENCOUNTER — TELEPHONE (OUTPATIENT)
Dept: FAMILY MEDICINE CLINIC | Facility: CLINIC | Age: 68
End: 2023-10-06

## 2023-10-06 NOTE — TELEPHONE ENCOUNTER
Patient is calling for a refill of her Xarelto 20mg.  She would like a 90 day supply, please advise thank you

## 2023-10-09 DIAGNOSIS — I26.99 OTHER PULMONARY EMBOLISM WITHOUT ACUTE COR PULMONALE, UNSPECIFIED CHRONICITY (HCC): ICD-10-CM

## 2023-10-09 NOTE — TELEPHONE ENCOUNTER
Patient called on Friday to get a refill on her Xarelto 20mg, She called again this morning because she is currently out of the medication. Please advise thank you.

## 2024-01-22 DIAGNOSIS — I26.99 OTHER PULMONARY EMBOLISM WITHOUT ACUTE COR PULMONALE, UNSPECIFIED CHRONICITY (HCC): ICD-10-CM

## 2024-01-24 ENCOUNTER — OFFICE VISIT (OUTPATIENT)
Dept: FAMILY MEDICINE CLINIC | Facility: CLINIC | Age: 69
End: 2024-01-24
Payer: COMMERCIAL

## 2024-01-24 ENCOUNTER — LAB (OUTPATIENT)
Dept: LAB | Facility: CLINIC | Age: 69
End: 2024-01-24
Payer: COMMERCIAL

## 2024-01-24 VITALS
TEMPERATURE: 97.4 F | OXYGEN SATURATION: 99 % | HEART RATE: 88 BPM | SYSTOLIC BLOOD PRESSURE: 134 MMHG | WEIGHT: 206 LBS | RESPIRATION RATE: 16 BRPM | DIASTOLIC BLOOD PRESSURE: 84 MMHG | BODY MASS INDEX: 36.5 KG/M2 | HEIGHT: 63 IN

## 2024-01-24 DIAGNOSIS — R73.03 PRE-DIABETES: ICD-10-CM

## 2024-01-24 DIAGNOSIS — Z86.711 HISTORY OF PULMONARY EMBOLISM: ICD-10-CM

## 2024-01-24 DIAGNOSIS — Z00.00 ANNUAL PHYSICAL EXAM: ICD-10-CM

## 2024-01-24 DIAGNOSIS — E66.9 OBESITY (BMI 30-39.9): ICD-10-CM

## 2024-01-24 DIAGNOSIS — Z12.31 ENCOUNTER FOR SCREENING MAMMOGRAM FOR MALIGNANT NEOPLASM OF BREAST: ICD-10-CM

## 2024-01-24 DIAGNOSIS — N39.46 MIXED STRESS AND URGE URINARY INCONTINENCE: ICD-10-CM

## 2024-01-24 DIAGNOSIS — D68.59 HYPERCOAGULABLE STATE (HCC): ICD-10-CM

## 2024-01-24 DIAGNOSIS — K21.9 GASTROESOPHAGEAL REFLUX DISEASE WITHOUT ESOPHAGITIS: Primary | ICD-10-CM

## 2024-01-24 DIAGNOSIS — Z78.0 POSTMENOPAUSAL: ICD-10-CM

## 2024-01-24 DIAGNOSIS — R42 VERTIGO: ICD-10-CM

## 2024-01-24 PROBLEM — R07.89 LEFT-SIDED CHEST WALL PAIN: Status: RESOLVED | Noted: 2019-08-26 | Resolved: 2024-01-24

## 2024-01-24 PROBLEM — S52.502A CLOSED FRACTURE OF LEFT DISTAL RADIUS AND ULNA: Status: RESOLVED | Noted: 2018-02-13 | Resolved: 2024-01-24

## 2024-01-24 PROBLEM — Z11.59 ENCOUNTER FOR HEPATITIS C SCREENING TEST FOR LOW RISK PATIENT: Status: RESOLVED | Noted: 2020-07-29 | Resolved: 2024-01-24

## 2024-01-24 PROBLEM — S52.602A CLOSED FRACTURE OF LEFT DISTAL RADIUS AND ULNA: Status: RESOLVED | Noted: 2018-02-13 | Resolved: 2024-01-24

## 2024-01-24 PROBLEM — R73.01 ELEVATED FASTING GLUCOSE: Status: RESOLVED | Noted: 2020-07-29 | Resolved: 2024-01-24

## 2024-01-24 PROBLEM — Z13.6 SCREENING FOR CARDIOVASCULAR CONDITION: Status: RESOLVED | Noted: 2019-08-26 | Resolved: 2024-01-24

## 2024-01-24 LAB
ALBUMIN SERPL BCP-MCNC: 4.2 G/DL (ref 3.5–5)
ALP SERPL-CCNC: 19 U/L (ref 34–104)
ALT SERPL W P-5'-P-CCNC: 20 U/L (ref 7–52)
ANION GAP SERPL CALCULATED.3IONS-SCNC: 5 MMOL/L
AST SERPL W P-5'-P-CCNC: 21 U/L (ref 13–39)
BACTERIA UR QL AUTO: NORMAL /HPF
BASOPHILS # BLD AUTO: 0.06 THOUSANDS/ÂΜL (ref 0–0.1)
BASOPHILS NFR BLD AUTO: 1 % (ref 0–1)
BILIRUB SERPL-MCNC: 0.47 MG/DL (ref 0.2–1)
BILIRUB UR QL STRIP: NEGATIVE
BUN SERPL-MCNC: 16 MG/DL (ref 5–25)
CALCIUM SERPL-MCNC: 9.5 MG/DL (ref 8.4–10.2)
CHLORIDE SERPL-SCNC: 107 MMOL/L (ref 96–108)
CHOLEST SERPL-MCNC: 228 MG/DL
CLARITY UR: CLEAR
CO2 SERPL-SCNC: 29 MMOL/L (ref 21–32)
COLOR UR: NORMAL
CREAT SERPL-MCNC: 0.73 MG/DL (ref 0.6–1.3)
EOSINOPHIL # BLD AUTO: 0.13 THOUSAND/ÂΜL (ref 0–0.61)
EOSINOPHIL NFR BLD AUTO: 3 % (ref 0–6)
ERYTHROCYTE [DISTWIDTH] IN BLOOD BY AUTOMATED COUNT: 13 % (ref 11.6–15.1)
EST. AVERAGE GLUCOSE BLD GHB EST-MCNC: 126 MG/DL
GFR SERPL CREATININE-BSD FRML MDRD: 84 ML/MIN/1.73SQ M
GLUCOSE P FAST SERPL-MCNC: 93 MG/DL (ref 65–99)
GLUCOSE UR STRIP-MCNC: NEGATIVE MG/DL
HBA1C MFR BLD: 6 %
HCT VFR BLD AUTO: 40.5 % (ref 34.8–46.1)
HDLC SERPL-MCNC: 73 MG/DL
HGB BLD-MCNC: 13 G/DL (ref 11.5–15.4)
HGB UR QL STRIP.AUTO: NEGATIVE
IMM GRANULOCYTES # BLD AUTO: 0.01 THOUSAND/UL (ref 0–0.2)
IMM GRANULOCYTES NFR BLD AUTO: 0 % (ref 0–2)
KETONES UR STRIP-MCNC: NEGATIVE MG/DL
LDLC SERPL CALC-MCNC: 144 MG/DL (ref 0–100)
LEUKOCYTE ESTERASE UR QL STRIP: NEGATIVE
LYMPHOCYTES # BLD AUTO: 1.88 THOUSANDS/ÂΜL (ref 0.6–4.47)
LYMPHOCYTES NFR BLD AUTO: 42 % (ref 14–44)
MCH RBC QN AUTO: 30.2 PG (ref 26.8–34.3)
MCHC RBC AUTO-ENTMCNC: 32.1 G/DL (ref 31.4–37.4)
MCV RBC AUTO: 94 FL (ref 82–98)
MONOCYTES # BLD AUTO: 0.35 THOUSAND/ÂΜL (ref 0.17–1.22)
MONOCYTES NFR BLD AUTO: 8 % (ref 4–12)
NEUTROPHILS # BLD AUTO: 2.07 THOUSANDS/ÂΜL (ref 1.85–7.62)
NEUTS SEG NFR BLD AUTO: 46 % (ref 43–75)
NITRITE UR QL STRIP: NEGATIVE
NON-SQ EPI CELLS URNS QL MICRO: NORMAL /HPF
NONHDLC SERPL-MCNC: 155 MG/DL
NRBC BLD AUTO-RTO: 0 /100 WBCS
PH UR STRIP.AUTO: 5 [PH]
PLATELET # BLD AUTO: 222 THOUSANDS/UL (ref 149–390)
PMV BLD AUTO: 10 FL (ref 8.9–12.7)
POTASSIUM SERPL-SCNC: 4.3 MMOL/L (ref 3.5–5.3)
PROT SERPL-MCNC: 7.4 G/DL (ref 6.4–8.4)
PROT UR STRIP-MCNC: NEGATIVE MG/DL
RBC # BLD AUTO: 4.31 MILLION/UL (ref 3.81–5.12)
RBC #/AREA URNS AUTO: NORMAL /HPF
SODIUM SERPL-SCNC: 141 MMOL/L (ref 135–147)
SP GR UR STRIP.AUTO: 1.01 (ref 1–1.03)
TRIGL SERPL-MCNC: 56 MG/DL
TSH SERPL DL<=0.05 MIU/L-ACNC: 2.25 UIU/ML (ref 0.45–4.5)
UROBILINOGEN UR STRIP-ACNC: <2 MG/DL
WBC # BLD AUTO: 4.5 THOUSAND/UL (ref 4.31–10.16)
WBC #/AREA URNS AUTO: NORMAL /HPF

## 2024-01-24 PROCEDURE — 83036 HEMOGLOBIN GLYCOSYLATED A1C: CPT

## 2024-01-24 PROCEDURE — 99214 OFFICE O/P EST MOD 30 MIN: CPT | Performed by: FAMILY MEDICINE

## 2024-01-24 PROCEDURE — 80061 LIPID PANEL: CPT

## 2024-01-24 PROCEDURE — 85025 COMPLETE CBC W/AUTO DIFF WBC: CPT

## 2024-01-24 PROCEDURE — 84443 ASSAY THYROID STIM HORMONE: CPT

## 2024-01-24 PROCEDURE — 81001 URINALYSIS AUTO W/SCOPE: CPT | Performed by: FAMILY MEDICINE

## 2024-01-24 PROCEDURE — 80053 COMPREHEN METABOLIC PANEL: CPT

## 2024-01-24 PROCEDURE — 99397 PER PM REEVAL EST PAT 65+ YR: CPT | Performed by: FAMILY MEDICINE

## 2024-01-24 PROCEDURE — 36415 COLL VENOUS BLD VENIPUNCTURE: CPT

## 2024-01-24 RX ORDER — MECLIZINE HCL 12.5 MG/1
12.5 TABLET ORAL 3 TIMES DAILY PRN
Qty: 30 TABLET | Refills: 3 | Status: SHIPPED | OUTPATIENT
Start: 2024-01-24

## 2024-01-24 NOTE — ASSESSMENT & PLAN NOTE
Has sensation  of spinning or imbalance  for a couple of years with turning head quickly or rolling over in bed  resolves in seconds  provoked vertigo during visit will try low dose meclizine drowsy warnings

## 2024-01-24 NOTE — PROGRESS NOTES
"Name: Loren Melgar      : 1955      MRN: [id number]  Encounter Provider: Jessica Jordan MD  Encounter Date: 2024   Encounter department: [facility] MEDICINE    Assessment & Plan     1. Gastroesophageal reflux disease without esophagitis  Assessment & Plan:  Ok on famotidine      2. Annual physical exam  Assessment & Plan:  Check routine labs      Orders:  -     CBC and differential; Future  -     Comprehensive metabolic panel; Future; Expected date: 2024  -     Lipid panel; Future; Expected date: 2024  -     TSH, 3rd generation; Future  -     Urinalysis with microscopic    3. Pre-diabetes  Assessment & Plan:  Check hga1c    Orders:  -     Hemoglobin A1C; Future; Expected date: 2024    4. Mixed stress and urge urinary incontinence    5. Obesity (BMI 30-39.9)  Assessment & Plan:  Discussion on diet and exercise guidelines for weight loss and  health reviewed with pt         6. Hypercoagulable state (HCC)  Assessment & Plan:  On long term xarelto  not sure told she had \"mutant gene\"      7. Vertigo  Assessment & Plan:  Has sensation  of spinning or imbalance  for a couple of years with turning head quickly or rolling over in bed  resolves in seconds  provoked vertigo during visit will try low dose meclizine drowsy warnings     Orders:  -     meclizine (ANTIVERT) 12.5 MG tablet; Take 1 tablet (12.5 mg total) by mouth 3 (three) times a day as needed for dizziness    8. History of pulmonary embolism  Assessment & Plan:  On long term xarelto       9. Postmenopausal  -     DXA bone density spine hip and pelvis; Future; Expected date: 2024    10. Encounter for screening mammogram for malignant neoplasm of breast  -     Mammo screening bilateral w 3d & cad; Future; Expected date: 2024           Subjective      HPI new pt here to establish care and for annual physical  hx of PE on long term xarelto told has coagulation abnormality  Review of Systems " "  Constitutional:  Negative for appetite change, chills, fatigue and fever.   Respiratory:  Negative for cough, chest tightness and shortness of breath.    Cardiovascular:  Negative for chest pain, palpitations and leg swelling.   Gastrointestinal:  Negative for abdominal pain, constipation, diarrhea, nausea and vomiting.   Genitourinary:  Negative for difficulty urinating and frequency.   Musculoskeletal:  Negative for arthralgias, back pain, gait problem and neck pain.   Skin:  Negative for rash.   Neurological:  Positive for dizziness (vertigo). Negative for weakness, light-headedness, numbness and headaches.   Hematological:  Does not bruise/bleed easily.   Psychiatric/Behavioral:  Negative for dysphoric mood and sleep disturbance. The patient is not nervous/anxious.        Current Outpatient Medications on File Prior to Visit   Medication Sig   • acetaminophen (TYLENOL) 325 mg tablet Take 1,000 mg by mouth 2 (two) times a day   • famotidine (PEPCID) 20 mg tablet Take 20 mg by mouth daily     • rivaroxaban (Xarelto) 20 mg tablet Take 1 tablet (20 mg total) by mouth daily with breakfast       Objective     /84 (BP Location: Left arm, Patient Position: Sitting, Cuff Size: Large)   Pulse 88   Temp (!) 97.4 °F (36.3 °C) (Tympanic)   Resp 16   Ht 5' 3\" (1.6 m)   Wt 93.4 kg (206 lb)   SpO2 99%   BMI 36.49 kg/m²     Physical Exam  Vitals reviewed.   Constitutional:       General: She is not in acute distress.     Appearance: Normal appearance. She is well-developed. She is not ill-appearing.   HENT:      Head: Normocephalic.      Right Ear: Tympanic membrane, ear canal and external ear normal.      Left Ear: Tympanic membrane, ear canal and external ear normal.      Nose: Nose normal.      Mouth/Throat:      Mouth: Mucous membranes are moist.      Pharynx: No oropharyngeal exudate.   Eyes:      General: Lids are normal. No scleral icterus.     Extraocular Movements: Extraocular movements intact.      " Conjunctiva/sclera: Conjunctivae normal.      Pupils: Pupils are equal, round, and reactive to light.   Neck:      Thyroid: No thyromegaly.      Vascular: No carotid bruit.   Cardiovascular:      Rate and Rhythm: Normal rate and regular rhythm.      Pulses: Normal pulses.      Heart sounds: Normal heart sounds. No murmur heard.     No friction rub.   Pulmonary:      Effort: Pulmonary effort is normal. No respiratory distress.      Breath sounds: Normal breath sounds. No stridor. No wheezing, rhonchi or rales.   Chest:   Breasts:     Breasts are symmetrical.      Right: Normal. No swelling, bleeding, inverted nipple, mass, nipple discharge, skin change or tenderness.      Left: Normal. No swelling, bleeding, inverted nipple, mass, nipple discharge, skin change or tenderness.   Abdominal:      General: Bowel sounds are normal. There is no distension.      Palpations: Abdomen is soft. There is no mass.      Tenderness: There is no abdominal tenderness. There is no guarding.      Hernia: No hernia is present.   Musculoskeletal:         General: Normal range of motion.      Cervical back: Full passive range of motion without pain, normal range of motion and neck supple.   Lymphadenopathy:      Cervical: No cervical adenopathy.   Skin:     General: Skin is warm and dry.      Findings: No rash.      Comments: No abnormal appearing moles   Neurological:      General: No focal deficit present.      Mental Status: She is alert and oriented to person, place, and time. Mental status is at baseline.      Cranial Nerves: No cranial nerve deficit.      Sensory: No sensory deficit.      Motor: No weakness, tremor or abnormal muscle tone.      Coordination: Coordination normal.      Gait: Gait normal.      Deep Tendon Reflexes: Reflexes normal. Babinski sign absent on the right side.      Comments: Mild vertigo  provoked during visit when lying down   Psychiatric:         Mood and Affect: Mood normal.         Speech: Speech normal.          Behavior: Behavior normal.         Thought Content: Thought content normal.         Judgment: Judgment normal.       Jessica Jordan MD

## 2024-02-23 DIAGNOSIS — Z11.1 SCREENING EXAMINATION FOR PULMONARY TUBERCULOSIS: Primary | ICD-10-CM

## 2024-04-30 DIAGNOSIS — I26.99 OTHER PULMONARY EMBOLISM WITHOUT ACUTE COR PULMONALE, UNSPECIFIED CHRONICITY (HCC): ICD-10-CM

## 2024-04-30 NOTE — TELEPHONE ENCOUNTER
Reason for call:   [x] Refill   [] Prior Auth  [] Other:     Office:   [x] PCP/Provider -   [] Specialty/Provider -     Medication:   rivaroxaban (Xarelto) 20 mg tablet - Take 1 tablet (20 mg total) by mouth daily with breakfast     Pharmacy:   [facility]) - [place] - [address]     Does the patient have enough for 3 days?   [x] Yes   [] No - Send as HP to POD

## 2024-05-01 DIAGNOSIS — I26.99 OTHER PULMONARY EMBOLISM WITHOUT ACUTE COR PULMONALE, UNSPECIFIED CHRONICITY (HCC): ICD-10-CM

## 2025-06-03 ENCOUNTER — APPOINTMENT (OUTPATIENT)
Dept: LAB | Facility: CLINIC | Age: 70
End: 2025-06-03
Attending: PREVENTIVE MEDICINE

## 2025-06-03 DIAGNOSIS — I26.99 OTHER PULMONARY EMBOLISM WITHOUT ACUTE COR PULMONALE, UNSPECIFIED CHRONICITY (HCC): ICD-10-CM

## 2025-06-03 DIAGNOSIS — Z00.8 HEALTH EXAMINATION IN POPULATION SURVEY: ICD-10-CM

## 2025-06-03 LAB
CHOLEST SERPL-MCNC: 224 MG/DL (ref ?–200)
EST. AVERAGE GLUCOSE BLD GHB EST-MCNC: 126 MG/DL
HBA1C MFR BLD: 6 %
HDLC SERPL-MCNC: 68 MG/DL
LDLC SERPL CALC-MCNC: 143 MG/DL (ref 0–100)
NONHDLC SERPL-MCNC: 156 MG/DL
TRIGL SERPL-MCNC: 63 MG/DL (ref ?–150)

## 2025-06-03 PROCEDURE — 80061 LIPID PANEL: CPT

## 2025-06-03 PROCEDURE — 36415 COLL VENOUS BLD VENIPUNCTURE: CPT

## 2025-06-03 PROCEDURE — 83036 HEMOGLOBIN GLYCOSYLATED A1C: CPT

## 2025-06-19 ENCOUNTER — OFFICE VISIT (OUTPATIENT)
Dept: FAMILY MEDICINE CLINIC | Facility: CLINIC | Age: 70
End: 2025-06-19
Payer: COMMERCIAL

## 2025-06-19 VITALS
DIASTOLIC BLOOD PRESSURE: 80 MMHG | HEIGHT: 63 IN | WEIGHT: 197 LBS | HEART RATE: 82 BPM | BODY MASS INDEX: 34.91 KG/M2 | SYSTOLIC BLOOD PRESSURE: 130 MMHG | TEMPERATURE: 97.4 F | RESPIRATION RATE: 16 BRPM | OXYGEN SATURATION: 98 %

## 2025-06-19 DIAGNOSIS — Z13.820 ENCOUNTER FOR OSTEOPOROSIS SCREENING IN ASYMPTOMATIC POSTMENOPAUSAL PATIENT: ICD-10-CM

## 2025-06-19 DIAGNOSIS — Z13.6 SCREENING FOR CARDIOVASCULAR CONDITION: ICD-10-CM

## 2025-06-19 DIAGNOSIS — Z23 ENCOUNTER FOR IMMUNIZATION: ICD-10-CM

## 2025-06-19 DIAGNOSIS — Z00.00 ANNUAL PHYSICAL EXAM: Primary | ICD-10-CM

## 2025-06-19 DIAGNOSIS — Z12.31 ENCOUNTER FOR SCREENING MAMMOGRAM FOR BREAST CANCER: ICD-10-CM

## 2025-06-19 DIAGNOSIS — Z13.1 SCREENING FOR DIABETES MELLITUS: ICD-10-CM

## 2025-06-19 DIAGNOSIS — N39.46 MIXED STRESS AND URGE URINARY INCONTINENCE: ICD-10-CM

## 2025-06-19 DIAGNOSIS — Z78.0 ENCOUNTER FOR OSTEOPOROSIS SCREENING IN ASYMPTOMATIC POSTMENOPAUSAL PATIENT: ICD-10-CM

## 2025-06-19 DIAGNOSIS — Z13.29 THYROID DISORDER SCREENING: ICD-10-CM

## 2025-06-19 PROCEDURE — 90715 TDAP VACCINE 7 YRS/> IM: CPT | Performed by: FAMILY MEDICINE

## 2025-06-19 PROCEDURE — 90471 IMMUNIZATION ADMIN: CPT | Performed by: FAMILY MEDICINE

## 2025-06-19 PROCEDURE — 99397 PER PM REEVAL EST PAT 65+ YR: CPT | Performed by: FAMILY MEDICINE

## 2025-06-19 NOTE — PROGRESS NOTES
Adult Annual Physical  Name: Loren Melgar      : 1955      MRN: [id number]  Encounter Provider: Lavonne Moore MD  Encounter Date: 2025   Encounter department: [facility]    :  Assessment & Plan  Annual physical exam    Orders:    CBC and differential; Future    Comprehensive metabolic panel; Future    Hemoglobin A1C; Future    Lipid panel; Future    TSH, 3rd generation with Free T4 reflex; Future    Encounter for screening mammogram for breast cancer    Orders:    Mammo screening bilateral w 3d and cad; Future    Encounter for immunization    Orders:    TDAP VACCINE GREATER THAN OR EQUAL TO 8YO IM    Encounter for osteoporosis screening in asymptomatic postmenopausal patient    Orders:    DXA bone density spine hip and pelvis; Future    Screening for diabetes mellitus    Orders:    Hemoglobin A1C; Future    Screening for cardiovascular condition    Orders:    Lipid panel; Future    Thyroid disorder screening    Orders:    TSH, 3rd generation with Free T4 reflex; Future    Mixed stress and urge urinary incontinence                        Preventive Screenings:  - Diabetes Screening: screening up-to-date  - Hepatitis C screening: screening up-to-date   - Cervical cancer screening: screening not indicated   - Colon cancer screening: screening up-to-date   - Lung cancer screening: screening not indicated     Immunizations:  - Immunizations due: Prevnar 20 and Zoster (Shingrix)      Depression Screening and Follow-up Plan: Patient was screened for depression during today's encounter. They screened negative with a PHQ-2 score of 0.          History of Present Illness     Adult Annual Physical:  Patient presents for annual physical.     Diet and Physical Activity:  - Diet/Nutrition: well balanced diet and consuming 3-5 servings of fruits/vegetables daily.  - Exercise: walking.    Depression Screening:  - PHQ-2 Score: 0    General Health:  - Sleep: sleeps well.  - Hearing: normal  "hearing bilateral ears.  - Vision: goes for regular eye exams and wears glasses.  - Dental: regular dental visits.    /GYN Health:  - Follows with GYN: yes.   - Menopause: postmenopausal.     Review of Systems   Constitutional:  Negative for fatigue and fever.   HENT:  Negative for congestion, facial swelling, mouth sores, rhinorrhea, sore throat and trouble swallowing.    Eyes:  Negative for pain and redness.   Respiratory:  Negative for cough, shortness of breath and wheezing.    Cardiovascular:  Negative for chest pain, palpitations and leg swelling.   Gastrointestinal:  Negative for abdominal pain, blood in stool, constipation, diarrhea and nausea.   Genitourinary:  Negative for dysuria, hematuria and urgency.   Musculoskeletal:  Negative for arthralgias, back pain and myalgias.   Skin:  Negative for rash and wound.   Neurological:  Negative for seizures, syncope and headaches.   Hematological:  Negative for adenopathy.   Psychiatric/Behavioral:  Negative for agitation and behavioral problems.      Medical History Reviewed by provider this encounter:  Tobacco  Allergies  Meds  Problems  Med Hx  Surg Hx  Fam Hx     .  Medications Ordered Prior to Encounter[1]   Social History[2]    Objective   /80 (BP Location: Right arm, Patient Position: Sitting, Cuff Size: Large)   Pulse 82   Temp (!) 97.4 °F (36.3 °C) (Tympanic)   Resp 16   Ht 5' 3\" (1.6 m)   Wt 89.4 kg (197 lb)   SpO2 98%   BMI 34.90 kg/m²     Physical Exam  Vitals and nursing note reviewed.   Constitutional:       Appearance: Normal appearance. She is well-developed. She is obese.   HENT:      Head: Normocephalic and atraumatic.      Right Ear: Tympanic membrane, ear canal and external ear normal.      Left Ear: Tympanic membrane, ear canal and external ear normal.      Nose: Nose normal.      Mouth/Throat:      Pharynx: No oropharyngeal exudate.     Eyes:      General: No scleral icterus.        Right eye: No discharge.         Left " eye: No discharge.      Conjunctiva/sclera: Conjunctivae normal.      Pupils: Pupils are equal, round, and reactive to light.     Neck:      Thyroid: No thyromegaly.     Cardiovascular:      Rate and Rhythm: Normal rate and regular rhythm.      Heart sounds: No murmur heard.     No gallop.   Pulmonary:      Effort: Pulmonary effort is normal. No respiratory distress.      Breath sounds: Normal breath sounds. No wheezing or rales.   Abdominal:      Palpations: Abdomen is soft.      Tenderness: There is no abdominal tenderness.     Musculoskeletal:         General: No tenderness or deformity.      Cervical back: Normal range of motion.      Right lower leg: No edema.      Left lower leg: No edema.   Lymphadenopathy:      Cervical: No cervical adenopathy.     Skin:     General: Skin is warm.      Capillary Refill: Capillary refill takes less than 2 seconds.      Findings: No erythema or rash.     Neurological:      Mental Status: She is alert and oriented to person, place, and time.      Deep Tendon Reflexes: Reflexes normal.     Psychiatric:         Behavior: Behavior normal.         Thought Content: Thought content normal.         Judgment: Judgment normal.              [1]   Current Outpatient Medications on File Prior to Visit   Medication Sig Dispense Refill    acetaminophen (TYLENOL) 325 mg tablet Take 1,000 mg by mouth in the morning and 1,000 mg in the evening.      famotidine (PEPCID) 20 mg tablet Take 20 mg by mouth in the morning.      meclizine (ANTIVERT) 12.5 MG tablet Take 1 tablet (12.5 mg total) by mouth 3 (three) times a day as needed for dizziness 30 tablet 3    rivaroxaban (Xarelto) 20 mg tablet Take 1 tablet (20 mg total) by mouth daily with breakfast 90 tablet 1     No current facility-administered medications on file prior to visit.   [2]   Social History  Tobacco Use    Smoking status: Never    Smokeless tobacco: Never   Vaping Use    Vaping status: Never Used   Substance and Sexual Activity     Alcohol use: Yes     Comment: socially    Drug use: No    Sexual activity: Not Currently     Partners: Male

## 2025-06-19 NOTE — PATIENT INSTRUCTIONS
"Patient Education     Routine physical for adults   The Basics   Written by the doctors and editors at [facility]   What is a physical? -- A physical is a routine visit, or \"check-up,\" with your doctor. You might also hear it called a \"wellness visit\" or \"preventive visit.\"  During each visit, the doctor will:   Ask about your physical and mental health   Ask about your habits, behaviors, and lifestyle   Do an exam   Give you vaccines if needed   Talk to you about any medicines you take   Give advice about your health   Answer your questions  Getting regular check-ups is an important part of taking care of your health. It can help your doctor find and treat any problems you have. But it's also important for preventing health problems.  A routine physical is different from a \"sick visit.\" A sick visit is when you see a doctor because of a health concern or problem. Since physicals are scheduled ahead of time, you can think about what you want to ask the doctor.  How often should I get a physical? -- It depends on your age and health. In general, for people age 21 years and older:   If you are younger than 50 years, you might be able to get a physical every 3 years.   If you are 50 years or older, your doctor might recommend a physical every year.  If you have an ongoing health condition, like diabetes or high blood pressure, your doctor will probably want to see you more often.  What happens during a physical? -- In general, each visit will include:   Physical exam - The doctor or nurse will check your height, weight, heart rate, and blood pressure. They will also look at your eyes and ears. They will ask about how you are feeling and whether you have any symptoms that bother you.   Medicines - It's a good idea to bring a list of all the medicines you take to each doctor visit. Your doctor will talk to you about your medicines and answer any questions. Tell them if you are having any side effects that bother you. You " "should also tell them if you are having trouble paying for any of your medicines.   Habits and behaviors - This includes:   Your diet   Your exercise habits   Whether you smoke, drink alcohol, or use drugs   Whether you are sexually active   Whether you feel safe at home  Your doctor will talk to you about things you can do to improve your health and lower your risk of health problems. They will also offer help and support. For example, if you want to quit smoking, they can give you advice and might prescribe medicines. If you want to improve your diet or get more physical activity, they can help you with this, too.   Lab tests, if needed - The tests you get will depend on your age and situation. For example, your doctor might want to check your:   Cholesterol   Blood sugar   Iron level   Vaccines - The recommended vaccines will depend on your age, health, and what vaccines you already had. Vaccines are very important because they can prevent certain serious or deadly infections.   Discussion of screening - \"Screening\" means checking for diseases or other health problems before they cause symptoms. Your doctor can recommend screening based on your age, risk, and preferences. This might include tests to check for:   Cancer, such as breast, prostate, cervical, ovarian, colorectal, prostate, lung, or skin cancer   Sexually transmitted infections, such as chlamydia and gonorrhea   Mental health conditions like depression and anxiety  Your doctor will talk to you about the different types of screening tests. They can help you decide which screenings to have. They can also explain what the results might mean.   Answering questions - The physical is a good time to ask the doctor or nurse questions about your health. If needed, they can refer you to other doctors or specialists, too.  Adults older than 65 years often need other care, too. As you get older, your doctor will talk to you about:   How to prevent falling at " home   Hearing or vision tests   Memory testing   How to take your medicines safely   Making sure that you have the help and support you need at home  All topics are updated as new evidence becomes available and our peer review process is complete.  This topic retrieved from [email or web address] on: May 02, 2024.  Topic 756988 Version 1.0  Release: 32.4.3 - C32.122  © 2024 [employer] and/or its affiliates. All rights reserved.  Consumer Information Use and Disclaimer   Disclaimer: This generalized information is a limited summary of diagnosis, treatment, and/or medication information. It is not meant to be comprehensive and should be used as a tool to help the user understand and/or assess potential diagnostic and treatment options. It does NOT include all information about conditions, treatments, medications, side effects, or risks that may apply to a specific patient. It is not intended to be medical advice or a substitute for the medical advice, diagnosis, or treatment of a health care provider based on the health care provider's examination and assessment of a patient's specific and unique circumstances. Patients must speak with a health care provider for complete information about their health, medical questions, and treatment options, including any risks or benefits regarding use of medications. This information does not endorse any treatments or medications as safe, effective, or approved for treating a specific patient. [employer] and its affiliates disclaim any warranty or liability relating to this information or the use thereof.The use of this information is governed by the Terms of Use, available at https://www.[employer].com/en/know/clinical-effectiveness-terms. 2024© [employer] and its affiliates and/or licensors. All rights reserved.  Copyright   © 2024 [employer] and/or its affiliates. All rights reserved.

## 2025-06-25 ENCOUNTER — APPOINTMENT (OUTPATIENT)
Dept: LAB | Facility: CLINIC | Age: 70
End: 2025-06-25
Attending: FAMILY MEDICINE
Payer: COMMERCIAL

## 2025-06-25 DIAGNOSIS — Z13.1 SCREENING FOR DIABETES MELLITUS: ICD-10-CM

## 2025-06-25 DIAGNOSIS — Z13.29 THYROID DISORDER SCREENING: ICD-10-CM

## 2025-06-25 DIAGNOSIS — Z13.6 SCREENING FOR CARDIOVASCULAR CONDITION: ICD-10-CM

## 2025-06-25 DIAGNOSIS — Z00.00 ANNUAL PHYSICAL EXAM: ICD-10-CM

## 2025-06-25 LAB
ALBUMIN SERPL BCG-MCNC: 4 G/DL (ref 3.5–5)
ALP SERPL-CCNC: 19 U/L (ref 34–104)
ALT SERPL W P-5'-P-CCNC: 11 U/L (ref 7–52)
ANION GAP SERPL CALCULATED.3IONS-SCNC: 4 MMOL/L (ref 4–13)
AST SERPL W P-5'-P-CCNC: 14 U/L (ref 13–39)
BASOPHILS # BLD AUTO: 0.05 THOUSANDS/ÂΜL (ref 0–0.1)
BASOPHILS NFR BLD AUTO: 1 % (ref 0–1)
BILIRUB SERPL-MCNC: 0.38 MG/DL (ref 0.2–1)
BUN SERPL-MCNC: 17 MG/DL (ref 5–25)
CALCIUM SERPL-MCNC: 9.1 MG/DL (ref 8.4–10.2)
CHLORIDE SERPL-SCNC: 109 MMOL/L (ref 96–108)
CHOLEST SERPL-MCNC: 203 MG/DL (ref ?–200)
CO2 SERPL-SCNC: 26 MMOL/L (ref 21–32)
CREAT SERPL-MCNC: 0.73 MG/DL (ref 0.6–1.3)
EOSINOPHIL # BLD AUTO: 0.14 THOUSAND/ÂΜL (ref 0–0.61)
EOSINOPHIL NFR BLD AUTO: 3 % (ref 0–6)
ERYTHROCYTE [DISTWIDTH] IN BLOOD BY AUTOMATED COUNT: 12.8 % (ref 11.6–15.1)
EST. AVERAGE GLUCOSE BLD GHB EST-MCNC: 123 MG/DL
GFR SERPL CREATININE-BSD FRML MDRD: 83 ML/MIN/1.73SQ M
GLUCOSE P FAST SERPL-MCNC: 106 MG/DL (ref 65–99)
HBA1C MFR BLD: 5.9 %
HCT VFR BLD AUTO: 37.3 % (ref 34.8–46.1)
HDLC SERPL-MCNC: 54 MG/DL
HGB BLD-MCNC: 12.5 G/DL (ref 11.5–15.4)
IMM GRANULOCYTES # BLD AUTO: 0.01 THOUSAND/UL (ref 0–0.2)
IMM GRANULOCYTES NFR BLD AUTO: 0 % (ref 0–2)
LDLC SERPL CALC-MCNC: 135 MG/DL (ref 0–100)
LYMPHOCYTES # BLD AUTO: 2.18 THOUSANDS/ÂΜL (ref 0.6–4.47)
LYMPHOCYTES NFR BLD AUTO: 44 % (ref 14–44)
MCH RBC QN AUTO: 30.6 PG (ref 26.8–34.3)
MCHC RBC AUTO-ENTMCNC: 33.5 G/DL (ref 31.4–37.4)
MCV RBC AUTO: 91 FL (ref 82–98)
MONOCYTES # BLD AUTO: 0.42 THOUSAND/ÂΜL (ref 0.17–1.22)
MONOCYTES NFR BLD AUTO: 8 % (ref 4–12)
NEUTROPHILS # BLD AUTO: 2.18 THOUSANDS/ÂΜL (ref 1.85–7.62)
NEUTS SEG NFR BLD AUTO: 44 % (ref 43–75)
NONHDLC SERPL-MCNC: 149 MG/DL
NRBC BLD AUTO-RTO: 0 /100 WBCS
PLATELET # BLD AUTO: 219 THOUSANDS/UL (ref 149–390)
PMV BLD AUTO: 10 FL (ref 8.9–12.7)
POTASSIUM SERPL-SCNC: 4.4 MMOL/L (ref 3.5–5.3)
PROT SERPL-MCNC: 7.2 G/DL (ref 6.4–8.4)
RBC # BLD AUTO: 4.08 MILLION/UL (ref 3.81–5.12)
SODIUM SERPL-SCNC: 139 MMOL/L (ref 135–147)
TRIGL SERPL-MCNC: 68 MG/DL (ref ?–150)
TSH SERPL DL<=0.05 MIU/L-ACNC: 1.97 UIU/ML (ref 0.45–4.5)
WBC # BLD AUTO: 4.98 THOUSAND/UL (ref 4.31–10.16)

## 2025-06-25 PROCEDURE — 85025 COMPLETE CBC W/AUTO DIFF WBC: CPT

## 2025-06-25 PROCEDURE — 80053 COMPREHEN METABOLIC PANEL: CPT

## 2025-06-25 PROCEDURE — 83036 HEMOGLOBIN GLYCOSYLATED A1C: CPT

## 2025-06-25 PROCEDURE — 36415 COLL VENOUS BLD VENIPUNCTURE: CPT

## 2025-06-25 PROCEDURE — 84443 ASSAY THYROID STIM HORMONE: CPT

## 2025-06-25 PROCEDURE — 80061 LIPID PANEL: CPT

## 2025-07-05 ENCOUNTER — HOSPITAL ENCOUNTER (OUTPATIENT)
Facility: HOSPITAL | Age: 70
Discharge: HOME | End: 2025-07-05
Attending: FAMILY MEDICINE
Payer: COMMERCIAL

## 2025-07-05 VITALS — WEIGHT: 193 LBS | BODY MASS INDEX: 35.51 KG/M2 | HEIGHT: 62 IN

## 2025-07-05 DIAGNOSIS — Z13.820 ENCOUNTER FOR OSTEOPOROSIS SCREENING IN ASYMPTOMATIC POSTMENOPAUSAL PATIENT: ICD-10-CM

## 2025-07-05 DIAGNOSIS — Z12.31 ENCOUNTER FOR SCREENING MAMMOGRAM FOR BREAST CANCER: ICD-10-CM

## 2025-07-05 DIAGNOSIS — Z78.0 ENCOUNTER FOR OSTEOPOROSIS SCREENING IN ASYMPTOMATIC POSTMENOPAUSAL PATIENT: ICD-10-CM

## 2025-07-05 PROCEDURE — 77067 SCR MAMMO BI INCL CAD: CPT

## 2025-07-05 PROCEDURE — 77080 DXA BONE DENSITY AXIAL: CPT

## 2025-07-05 PROCEDURE — 77063 BREAST TOMOSYNTHESIS BI: CPT

## (undated) DEVICE — PADDING CAST 4 IN  COTTON STRL

## (undated) DEVICE — COBAN 4 IN STERILE

## (undated) DEVICE — BASIC SINGLE BASIN-LF: Brand: MEDLINE INDUSTRIES, INC.

## (undated) DEVICE — IMPLANTABLE DEVICE
Type: IMPLANTABLE DEVICE | Site: WRIST | Status: NON-FUNCTIONAL
Brand: K-WIRE
Removed: 2018-02-19

## (undated) DEVICE — CAST PADDING 4 IN SYNTHETIC NON-STRL

## (undated) DEVICE — CHLORAPREP HI-LITE 26ML ORANGE

## (undated) DEVICE — SCD SEQUENTIAL COMPRESSION COMFORT SLEEVE MEDIUM KNEE LENGTH: Brand: KENDALL SCD

## (undated) DEVICE — GLOVE SRG BIOGEL 8.5

## (undated) DEVICE — INTENDED FOR TISSUE SEPARATION, AND OTHER PROCEDURES THAT REQUIRE A SHARP SURGICAL BLADE TO PUNCTURE OR CUT.: Brand: BARD-PARKER ® CARBON RIB-BACK BLADES

## (undated) DEVICE — Device: Brand: DRILL BIT F.A.S.T.

## (undated) DEVICE — ACE WRAP 3 IN UNSTERILE

## (undated) DEVICE — SUT VICRYL 2-0 SH 27 IN UNDYED J417H

## (undated) DEVICE — 3000CC GUARDIAN II: Brand: GUARDIAN

## (undated) DEVICE — TUBING SUCTION 5MM X 12 FT

## (undated) DEVICE — GLOVE SRG BIOGEL 8

## (undated) DEVICE — SINGLE-USE BIOPSY FORCEPS: Brand: RADIAL JAW 4

## (undated) DEVICE — 3M™ STERI-DRAPE™ U-DRAPE 1015: Brand: STERI-DRAPE™

## (undated) DEVICE — 10FR FRAZIER SUCTION HANDLE: Brand: CARDINAL HEALTH

## (undated) DEVICE — DRAPE C-ARM X-RAY

## (undated) DEVICE — SUT ETHILON 3-0 PS-1 18 IN 1663G

## (undated) DEVICE — PADDING CAST 3IN COTTON STRL

## (undated) DEVICE — OCCLUSIVE GAUZE STRIP,3% BISMUTH TRIBROMOPHENATE IN PETROLATUM BLEND: Brand: XEROFORM

## (undated) DEVICE — NEEDLE 18 G X 1 1/2

## (undated) DEVICE — GLOVE SRG BIOGEL 7.5

## (undated) DEVICE — BULB SYRINGE,IRRIGATION WITH PROTECTIVE CAP: Brand: DOVER

## (undated) DEVICE — SPLINT 4 X 15 IN FAST SET PLASTER

## (undated) DEVICE — Device: Brand: DRILL BIT

## (undated) DEVICE — STERILE ALLENTOWN HAND PACK: Brand: CARDINAL HEALTH

## (undated) DEVICE — ACE WRAP 4 IN UNSTERILE

## (undated) DEVICE — GLOVE INDICATOR PI UNDERGLOVE SZ 8.5 BLUE